# Patient Record
Sex: FEMALE | Race: WHITE | NOT HISPANIC OR LATINO | Employment: OTHER | URBAN - METROPOLITAN AREA
[De-identification: names, ages, dates, MRNs, and addresses within clinical notes are randomized per-mention and may not be internally consistent; named-entity substitution may affect disease eponyms.]

---

## 2017-03-06 ENCOUNTER — OFFICE VISIT (OUTPATIENT)
Dept: PULMONOLOGY | Facility: HOSPICE | Age: 62
End: 2017-03-06
Payer: MEDICARE

## 2017-03-06 VITALS
SYSTOLIC BLOOD PRESSURE: 122 MMHG | HEIGHT: 67 IN | HEART RATE: 77 BPM | TEMPERATURE: 97.3 F | BODY MASS INDEX: 34.12 KG/M2 | RESPIRATION RATE: 16 BRPM | DIASTOLIC BLOOD PRESSURE: 74 MMHG | WEIGHT: 217.4 LBS | OXYGEN SATURATION: 97 %

## 2017-03-06 DIAGNOSIS — R93.89 ABNORMAL CT OF THE CHEST: ICD-10-CM

## 2017-03-06 DIAGNOSIS — R09.02 HYPOXEMIA: ICD-10-CM

## 2017-03-06 DIAGNOSIS — J84.9 INTERSTITIAL LUNG DISEASE (HCC): ICD-10-CM

## 2017-03-06 DIAGNOSIS — J45.30 MILD PERSISTENT ASTHMA WITHOUT COMPLICATION: ICD-10-CM

## 2017-03-06 DIAGNOSIS — G47.33 OSA (OBSTRUCTIVE SLEEP APNEA): ICD-10-CM

## 2017-03-06 PROCEDURE — 3017F COLORECTAL CA SCREEN DOC REV: CPT | Performed by: NURSE PRACTITIONER

## 2017-03-06 PROCEDURE — 1036F TOBACCO NON-USER: CPT | Performed by: NURSE PRACTITIONER

## 2017-03-06 PROCEDURE — 99214 OFFICE O/P EST MOD 30 MIN: CPT | Performed by: NURSE PRACTITIONER

## 2017-03-06 PROCEDURE — G8482 FLU IMMUNIZE ORDER/ADMIN: HCPCS | Performed by: NURSE PRACTITIONER

## 2017-03-06 PROCEDURE — G8419 CALC BMI OUT NRM PARAM NOF/U: HCPCS | Performed by: NURSE PRACTITIONER

## 2017-03-06 PROCEDURE — 3014F SCREEN MAMMO DOC REV: CPT | Performed by: NURSE PRACTITIONER

## 2017-03-06 PROCEDURE — G8432 DEP SCR NOT DOC, RNG: HCPCS | Performed by: NURSE PRACTITIONER

## 2017-03-06 RX ORDER — CODEINE PHOSPHATE AND GUAIFENESIN 10; 100 MG/5ML; MG/5ML
5 SOLUTION ORAL EVERY 6 HOURS PRN
Qty: 240 ML | Refills: 0 | Status: SHIPPED | OUTPATIENT
Start: 2017-03-06 | End: 2017-03-31 | Stop reason: SDUPTHER

## 2017-03-06 NOTE — MR AVS SNAPSHOT
"        Tayler Osuna   3/6/2017 9:20 AM   Office Visit   MRN: [id number]    Department:  [facility]   Dept Phone:  [phone]    Description:  Female : 1955   Provider:  ANDREI Paul           Reason for Visit     Follow-Up 6 month follow up      Allergies as of 3/6/2017     Allergen Noted Reactions    Tape 2015   Rash    rash    Clindamycin 2011       C-Diff (Intestinal Infection)      You were diagnosed with     Mild persistent asthma without complication   [567436]       Interstitial lung disease (CMS-HCC)   [198166]       Hypoxemia   [799.02.ICD-9-CM]       Abnormal CT of the chest   [141824]       BMI 34.0-34.9,adult   [974998]       ELIA (obstructive sleep apnea)   [931227]         Vital Signs     Blood Pressure Pulse Temperature Respirations Height Weight    122/74 mmHg 77 36.3 °C (97.3 °F) 16 1.702 m (5' 7.01\") 98.612 kg (217 lb 6.4 oz)    Body Mass Index Oxygen Saturation Last Menstrual Period Smoking Status          34.04 kg/m2 97% 2008 Never Smoker         Basic Information     Date Of Birth Sex Race Ethnicity Preferred Language    1955 Female White Non- English      Your appointments     May 30, 2017  2:00 PM   15 Minute with Ashkan Walton M.D.   [facility] (--)    [address] [place] [id number] [phone]            Sep 05, 2017  9:20 AM   Established Patient Pul with ANDREI Paul   [facility] (--)    [address] [place] [address] [address]  [facility] [id number] [phone]              Problem List              ICD-10-CM Priority Class Noted - Resolved    Interstitial lung disease (CMS-HCC) J84.9   6/10/2011 - Present    Other chest pain R07.89   6/10/2011 - Present    Asthma J45.909   6/10/2011 - Present    Cholecystitis K81.9   6/10/2011 - Present    Arthritis M19.90   6/10/2011 - Present    Chronic pain syndrome G89.4   6/10/2011 - Present   " Lumbar disc disease M51.9   6/10/2011 - Present    Orbital fracture (HCC) S02.80XA   7/26/2011 - Present    Depression F32.9   7/26/2011 - Present    Anxiety F41.9   7/26/2011 - Present    Obesity E66.9   7/26/2011 - Present    Chest pain R07.9   7/1/2015 - Present    Hypoxemia R09.02   4/6/2016 - Present    Abnormal CT of the chest R93.8   4/6/2016 - Present    GERD (gastroesophageal reflux disease) K21.9   8/9/2016 - Present      Health Maintenance        Date Due Completion Dates    IMM DTaP/Tdap/Td Vaccine (1 - Tdap) 7/27/1976 ---    PAP SMEAR 1/1/2016 1/1/2013 (Done)    Override on 1/1/2013: Done    IMM INFLUENZA (1) 9/1/2016 9/21/2015, 9/25/2010, 10/7/2009    MAMMOGRAM 5/31/2017 5/31/2016 (Done)    Override on 5/31/2016: Done    COLONOSCOPY 3/2/2018 3/2/2015 (Done)    Override on 3/2/2015: Done (exact day unk)            Current Immunizations     13-VALENT PCV PREVNAR 6/1/2015    INFLUENZA VACCINE H1N1 10/30/2009  1:45 PM    Influenza TIV (IM) 2/28/2017, 9/21/2015, 10/7/2009  9:54 PM    Pneumococcal Vaccine (UF)Historical Data 12/28/2011  9:56 PM    Pneumococcal polysaccharide vaccine (PPSV-23) 8/9/2016    Tuberculin Skin Test 6/7/2016      Below and/or attached are the medications your provider expects you to take. Review all of your home medications and newly ordered medications with your provider and/or pharmacist. Follow medication instructions as directed by your provider and/or pharmacist. Please keep your medication list with you and share with your provider. Update the information when medications are discontinued, doses are changed, or new medications (including over-the-counter products) are added; and carry medication information at all times in the event of emergency situations     Allergies:  TAPE - Rash     CLINDAMYCIN - (reactions not documented)               Medications  Valid as of: March 06, 2017 -  9:45 AM    Generic Name Brand Name Tablet Size Instructions for use    Albuterol Sulfate  (Nebu Soln) PROVENTIL 2.5mg/3ml Inhale 2.5 mg mist 4 times a day.        Albuterol Sulfate (Aero Soln) albuterol 108 (90 BASE) MCG/ACT Inhale 2 Puffs by mouth every 6 hours as needed.        Aspirin (Tab)  MG Take 325 mg by mouth Once.        Cholestyramine (Pack) QUESTRAN 4 G Take 1 Packet by mouth every day.        Cyclobenzaprine HCl (Tab) FLEXERIL 10 MG Take  by mouth.        Fluticasone-Salmeterol (AEROSOL POWDER, BREATH ACTIVATED) ADVAIR DISKUS 500-50 MCG/DOSE INHALE 1 PUFF BY MOUTH 2 TIMES A DAY, rinse mouth ater each use        Furosemide (Tab) LASIX 20 MG Take 1 Tab by mouth every day.        Guaifenesin-Codeine (Solution) ROBITUSSIN -10 mg/5mL Take 5 mL by mouth every 6 hours as needed for Cough.        Guaifenesin-Codeine (Solution) ROBITUSSIN -10 mg/5mL Take 5 mL by mouth every 6 hours as needed for Cough.        Guaifenesin-Codeine (Solution) ROBITUSSIN -10 mg/5mL Take 5 mL by mouth every 6 hours as needed for Cough.        LevoFLOXacin (Tab) LEVAQUIN 500 MG Take 1 Tab by mouth every day.        LevoFLOXacin (Tab) LEVAQUIN 500 MG Take 1 Tab by mouth every day.        Multiple Vitamins-Minerals (Tab) THERAGRAN-M  Take 1 Tab by mouth every day.        Non Formulary Request Non Formulary Request  Uses 3 liters 02 at rest 5-6 liters with exertion        NON SPECIFIED   O2 3-4L/nc at rest; 5-6L/nc with exertion        Ondansetron HCl (Tab) ZOFRAN 4 MG Take 4 mg by mouth every four hours as needed for Nausea/Vomiting. As needed for nausea or vomiting.         Oxycodone-Acetaminophen (Tab) PERCOCET 5-325 MG Take 1-2 Tabs by mouth every four hours as needed (Pain).        Potassium Chloride (Tab CR) K-TAB 20 MEQ Take 20 mEq by mouth 2 times a day.        PredniSONE (Tab) DELTASONE 10 MG 1 po daily and as directed During periods of exacerbation use 3 po x days, 2 po x days then back to maintenance dose of 10 mg daily        Promethazine HCl (Tab) PHENERGAN 25 MG Take one by mouth  everyday        RaNITidine HCl (Tab) ZANTAC 150 MG Take 150 mg by mouth 2 times a day.        Tiotropium Bromide Monohydrate (Cap) SPIRIVA HANDIHALER 18 MCG inhale contents of 1 capsule once daily, using handihaler        .                 Medicines prescribed today were sent to:     [facility] - [place], [place] - [address]    [address] [facility] [place] [address]    Phone: [phone] Fax: [phone]    Open 24 Hours?: No      Medication refill instructions:       If your prescription bottle indicates you have medication refills left, it is not necessary to call your provider’s office. Please contact your pharmacy and they will refill your medication.    If your prescription bottle indicates you do not have any refills left, you may request refills at any time through one of the following ways: The online ScanNano system (except Urgent Care), by calling your provider’s office, or by asking your pharmacy to contact your provider’s office with a refill request. Medication refills are processed only during regular business hours and may not be available until the next business day. Your provider may request additional information or to have a follow-up visit with you prior to refilling your medication.   *Please Note: Medication refills are assigned a new Rx number when refilled electronically. Your pharmacy may indicate that no refills were authorized even though a new prescription for the same medication is available at the pharmacy. Please request the medicine by name with the pharmacy before contacting your provider for a refill.           ScanNano Access Code: Activation code not generated  Current ScanNano Status: Active

## 2017-03-28 ENCOUNTER — TELEPHONE (OUTPATIENT)
Dept: PULMONOLOGY | Facility: HOSPICE | Age: 62
End: 2017-03-28

## 2017-03-28 NOTE — TELEPHONE ENCOUNTER
Pt called in to say she has Acute Bronchitis. She was given Prednisone, Augmentin, and Nebs. She made a follow up for 3/31/17/ap

## 2017-03-31 ENCOUNTER — HOSPITAL ENCOUNTER (OUTPATIENT)
Dept: RADIOLOGY | Facility: MEDICAL CENTER | Age: 62
End: 2017-03-31
Attending: NURSE PRACTITIONER
Payer: MEDICARE

## 2017-03-31 ENCOUNTER — OFFICE VISIT (OUTPATIENT)
Dept: PULMONOLOGY | Facility: HOSPICE | Age: 62
End: 2017-03-31
Payer: MEDICARE

## 2017-03-31 VITALS
HEIGHT: 67 IN | BODY MASS INDEX: 32.49 KG/M2 | SYSTOLIC BLOOD PRESSURE: 130 MMHG | DIASTOLIC BLOOD PRESSURE: 84 MMHG | WEIGHT: 207 LBS | HEART RATE: 87 BPM | OXYGEN SATURATION: 94 % | TEMPERATURE: 97.9 F | RESPIRATION RATE: 16 BRPM

## 2017-03-31 DIAGNOSIS — J45.41 MODERATE PERSISTENT ASTHMA WITH ACUTE EXACERBATION: ICD-10-CM

## 2017-03-31 DIAGNOSIS — J45.30 MILD PERSISTENT ASTHMA WITHOUT COMPLICATION: ICD-10-CM

## 2017-03-31 DIAGNOSIS — J84.9 INTERSTITIAL LUNG DISEASE (HCC): ICD-10-CM

## 2017-03-31 DIAGNOSIS — R09.02 HYPOXEMIA: ICD-10-CM

## 2017-03-31 DIAGNOSIS — R93.89 ABNORMAL CT OF THE CHEST: ICD-10-CM

## 2017-03-31 PROCEDURE — 99214 OFFICE O/P EST MOD 30 MIN: CPT | Performed by: NURSE PRACTITIONER

## 2017-03-31 PROCEDURE — G8432 DEP SCR NOT DOC, RNG: HCPCS | Performed by: NURSE PRACTITIONER

## 2017-03-31 PROCEDURE — 3014F SCREEN MAMMO DOC REV: CPT | Performed by: NURSE PRACTITIONER

## 2017-03-31 PROCEDURE — G8419 CALC BMI OUT NRM PARAM NOF/U: HCPCS | Performed by: NURSE PRACTITIONER

## 2017-03-31 PROCEDURE — 1036F TOBACCO NON-USER: CPT | Performed by: NURSE PRACTITIONER

## 2017-03-31 PROCEDURE — 71020 DX-CHEST-2 VIEWS: CPT

## 2017-03-31 PROCEDURE — G8482 FLU IMMUNIZE ORDER/ADMIN: HCPCS | Performed by: NURSE PRACTITIONER

## 2017-03-31 RX ORDER — CODEINE PHOSPHATE AND GUAIFENESIN 10; 100 MG/5ML; MG/5ML
5 SOLUTION ORAL EVERY 6 HOURS PRN
Qty: 240 ML | Refills: 1 | Status: SHIPPED | OUTPATIENT
Start: 2017-03-31 | End: 2017-08-21 | Stop reason: SDUPTHER

## 2017-03-31 RX ORDER — PREDNISONE 10 MG/1
TABLET ORAL
Qty: 50 TAB | Refills: 2 | Status: SHIPPED | OUTPATIENT
Start: 2017-03-31 | End: 2017-05-30

## 2017-03-31 NOTE — PROGRESS NOTES
"CC:  Here for f/u sleep and pulmonary issues as listed below    HPI:   Tayler Osuna is a 61 y.o. year old female here today for follow-up on her Asthma and Obstructive sleep apnea with sick visit.  Patient has been sick since the 23rd with \"bronchitis\" producing green E and yellow sputum with fever and wheeze. She was started on Augmentin without change and then her internist changed her to Levaquin and taper of prednisone starting at 30mg with improvement of her symptoms with now light yellow or clear sputum with cough. She continues to have shortness of breath with wheezing, nasal congestion. Fever has resolved. She is using her nebulizers every 4 hours without complete resolution of wheeze. She has been taking Mucinex twice a day and Cheratussin cough medicine at night to help her sleep, Claritin and Benadryl for nasal congestion.S he does have a flutter valve for mucous clearance, and was reminded to use it with sickness. She denies hemoptysis, chest tightness, night sweats. Chest x-ray completed in today 3-30 1-2017 showing persistent bilateral relatively diffuse interstitial prominence consistent with interstitial lung disease with scarring in the lingula. No new focal consolidation identified.  he is compliant on Advair, Spiriva, Albuterol per nebulizer and Proair HFA inhaler.PFT's were updated 9/15/2016 and indicated an FEV1 of 2.39 L, 83% predicted, TLC 79% and DLCO 97%. Patient is compliance using 3 L of oxygen 24 7 and increases to 6 L with exertion. Patient started a new diet at the beginning year with weight loss.    She is compliant on CPAP 12 CM with 3 LPM 02 bleed in. CNOX in the past on this setting indicated adequate saturations with a mean of 95.3%. She did not bring her compliance chip to today's office visit. She reports she uses it every night for at least 8 hours. She sleeps better overall while wearing the machine and denies morning headache.    Prior Cardiology consult indicated a history " of Pulmonary fibrosis and Pulmonary hypertension. Her pulmonary pressures have improved since she has been on supplemental oxygen. She is on Lasix which is managed by Dr. Pierce, Cardiology. She has a history of respiratory failure and MRSA pneumonia with ARDS in 2009. Chest xray 9/2015 indicated stable scarring. Echo in 10/2015 indicates an EF of 70% with grade I diastolic dysfunction and an estimated RVSP of 40 mmHg. CTA 10/2015 indicated no evidence of PE. There is underlying areas of non specific fibrosis and bronchiectasis, similar to CT in 2011.  Chest xray April 2016 indicated bilateral areas of linear increased density with architectural distortion to be due to scarring or interstitial lung disease.    She had an exacerbation in March 2016 requiring antibiotics and steroids. Sputum cultures and Immunoglobulin levels were ordered. IgM, IgG and IgA levels are WNL.           Patient Active Problem List    Diagnosis Date Noted   • GERD (gastroesophageal reflux disease) 08/09/2016   • Hypoxemia 04/06/2016   • Abnormal CT of the chest 04/06/2016   • Chest pain 07/01/2015   • Orbital fracture (HCC) 07/26/2011   • Depression 07/26/2011   • Anxiety 07/26/2011   • Obesity 07/26/2011   • Interstitial lung disease (CMS-HCC) 06/10/2011   • Other chest pain 06/10/2011   • Asthma 06/10/2011   • Cholecystitis 06/10/2011   • Arthritis 06/10/2011   • Chronic pain syndrome 06/10/2011   • Lumbar disc disease 06/10/2011       Past Medical History   Diagnosis Date   • GERD (gastroesophageal reflux disease)    • ASTHMA    • Psychiatric problem    • Depression    • ARDS (adult respiratory distress syndrome) (CMS-HCC)    • Hx MRSA infection    • Arrhythmia    • Sleep apnea    • Arthritis      spine, knees   • MRSA (methicillin resistant Staphylococcus aureus)    • Backpain      L2 Compression Fx (chronic -Morphine 60mg x6 tabs qhs)    • O2 dependent    • Clostridium difficile diarrhea 2012   • Pulmonary HTN (CMS-HCC)    •  Pulmonary fibrosis (CMS-HCC)      Stan   • MRSA infection    • Gall stones    • ARDS (adult respiratory distress syndrome) (CMS-HCC)        Past Surgical History   Procedure Laterality Date   • Tracheostomy  9/3/2009     Performed by RIC SINGLETARY at SURGERY [facility] ORS   • Gastrostomy laparoscopic  9/12/2009     Performed by IGNACIO DEGROOT at SURGERY [facility] ORS   • Orbital fracture orif  7/26/2011     Performed by JANIE GEORGES at SURGERY [facility] ORS   • Orbital fracture orif  8/23/2011     Performed by JANIE GEORGES at SURGERY SAME DAY [facility] ORS   • Other  8/09     chest tube, trach, g-tube   • Other  1986     skin graft to leg   • Rectus repair  12/28/2011     Performed by AUGUST OREILLY at SURGERY SAME DAY [facility] ORS   • Other orthopedic surgery       2 hip repl orbital plates       History reviewed. No pertinent family history.    Social History     Social History   • Marital Status:      Spouse Name: N/A   • Number of Children: N/A   • Years of Education: N/A     Occupational History   • Not on file.     Social History Main Topics   • Smoking status: Never Smoker    • Smokeless tobacco: Never Used   • Alcohol Use: 0.0 oz/week     0 Standard drinks or equivalent per week      Comment: rarely   • Drug Use: No   • Sexual Activity: Not on file     Other Topics Concern   • Not on file     Social History Narrative       Current Outpatient Prescriptions   Medication Sig Dispense Refill   • guaifenesin-codeine (CHERATUSSIN AC) Solution oral solution Take 5 mL by mouth every 6 hours as needed for Cough. 240 mL 1   • predniSONE (DELTASONE) 10 MG Tab 60mg x 5 days, 50mg x 5 days, 40mg x 5 days, 30mg x 5 days, 20mg x 5 days, 10mg x 5 days 50 Tab 2   • nystatin (MYCOSTATIN) 732124 UNIT/GM Cream topical cream Apply topically twice daily to affected areas 1 Tube 1   • triamcinolone acetonide (KENALOG) 0.1 % Cream Apply to affected area twice a day 1 Tube 1   • mupirocin  (BACTROBAN) 2 % Ointment Apply to affected areas every day 1 Tube 2   • promethazine (PHENERGAN) 25 MG Tab Take one by mouth everyday 30 Tab 1   • ADVAIR DISKUS 500-50 MCG/DOSE AEROSOL POWDER, BREATH ACTIVATED INHALE 1 PUFF BY MOUTH 2 TIMES A DAY, rinse mouth ater each use 3 Inhaler 3   • potassium Chloride ER (K-TAB) 20 MEQ Tab CR tablet Take 20 mEq by mouth 2 times a day.     • SPIRIVA HANDIHALER 18 MCG Cap inhale contents of 1 capsule once daily, using handihaler 30 Cap 5   • furosemide (LASIX) 20 MG Tab Take 1 Tab by mouth every day.     • Non Formulary Request Uses 3 liters 02 at rest 5-6 liters with exertion     • aspirin (ASA) 325 MG TABS Take 325 mg by mouth Once.     • ranitidine (ZANTAC) 150 MG TABS Take 150 mg by mouth 2 times a day.     • cholestyramine (QUESTRAN) 4 G packet Take 1 Packet by mouth every day.     • albuterol (VENTOLIN OR PROVENTIL) 108 (90 BASE) MCG/ACT AERS Inhale 2 Puffs by mouth every 6 hours as needed.     • therapeutic multivitamin-minerals (THERAGRAN-M) TABS Take 1 Tab by mouth every day.     • NON SPECIFIED O2 3-4L/nc at rest; 5-6L/nc with exertion     • albuterol (PROVENTIL) 2.5mg/3ml NEBU Inhale 2.5 mg mist 4 times a day.     • ondansetron (ZOFRAN) 4 MG TABS Take 4 mg by mouth every four hours as needed for Nausea/Vomiting. As needed for nausea or vomiting.      • oxycodone-acetaminophen (PERCOCET) 5-325 MG Tab Take 1-2 Tabs by mouth every four hours as needed (Pain). 20 Tab 0   • cyclobenzaprine (FLEXERIL) 10 MG TABS Take  by mouth.       No current facility-administered medications for this visit.          Allergies: Tape and Clindamycin      ROS   Gen: Denies fever, chills, unintentional weight loss, fatigue, night sweats  E/N/T: Denies ear pain, nasal congestion  Resp:Denies Dyspnea, wheezing, cough, sputum production, hemoptysis  CV: Denies chest pain, chest tightness, palpitations, BLE edema  Sleep:Denies morning headache, insomnia, daytime somnolence, snoring, gasping for  "air, apnea  Neuro: Denies frequent headaches, weakness, dizziness  GI: Denies N/V, acid reflux/heartburn  See HPI.  All other systems reviewed and negative      Vital signs for this encounter:  Filed Vitals:    03/31/17 0951   Height: 1.702 m (5' 7.01\")   Weight: 93.895 kg (207 lb)   Weight % change since last entry.: 0 %   BP: 130/84   Pulse: 87   BMI (Calculated): 32.41   Resp: 16   Temp: 36.6 °C (97.9 °F)   O2 sat % on O2: 94 %   O2 Flow Rate (L/min): 3                 Physical Exam:   Appearance: well developed, well nourished, no acute distress.  Eyes: PERRL, EOM intact, sclere white, conjunctiva moist.  Ears: no lesions or deformities.  Hearing: grossly intact.  Nose: no lesions or deformities.  Dentition: good dentition.  Oropharynx: tongue normal, posterior pharynx without erythema or exudate.  Neck: supple, trachea midline, no masses.  Respiratory effort: no intercostal retractions or use of accessory muscles.  Lung auscultation: Bilateral rhonchi throughout  Heart auscultation: no murmur, rub, or gallop.   Extremities: no cyanosis or edema.  Abdomen: soft, non-tender, no masses.  Gait and station: grossly normal   Digits and Nails: no clubbing, cyanosis, petechiae, or nodes.  Cranial nerves: grossly normal.  Motor: no focal deficits observed.  Skin: no rashes, lesions, or ulcers noted.  Orientation: oriented to time, place, and person.  Mood and affect: mood and affect appropriate, normal interaction with examiner.    Assessment   1. Moderate persistent asthma with acute exacerbation  DX-CHEST-2 VIEWS    CANCELED: DX-CHEST-2 VIEWS   2. Mild persistent asthma without complication  guaifenesin-codeine (CHERATUSSIN AC) Solution oral solution   3. Interstitial lung disease (CMS-HCC)  predniSONE (DELTASONE) 10 MG Tab   4. Abnormal CT of the chest     5. Hypoxemia         PLAN:   Patient Instructions   1) Continue Advair 500/50, Spiriva, Albuterol per neb and HFA inhaler.    2) Continue Levaquin 500 mg 1 po " daily x 10 days and Prednisone taper to have on hand  3) Continue to follow with Cardiology for diuretic management. Request consult records from Dr. Pierce, Cardiology.    4) RX for Cheratussin A/C 5 ml po q 6 hours prn cough to have on hand.    5) Continue 02 3 LPM 24/7, 6 LPM with exertion.  6) Ongoing weight loss efforts encourage. Encouraged routine walking/exercise.  7) She does have a flutter valve on hand to help with mucous clearance.  8) Continue CPAP at 12 CM H20 with 3 LPM 02 bleed in. Order to DME for new mask and supplies. Compliance chip at follow up.  9) She is up to date on her Prevnar 13, Pneumovax 23 and Influence vaccines.    10) CXR complete at ancillary department to r/o PNA

## 2017-03-31 NOTE — MR AVS SNAPSHOT
"        Tayler Osuna   3/31/2017 10:00 AM   Office Visit   MRN: [id number]    Department:  [facility]   Dept Phone:  [phone]    Description:  Female : 1955   Provider:  BHASKAR Rodriguez           Reason for Visit     Asthma Last seen 3/6/17     Cough phlegm thick yellow green, wheezing       Allergies as of 3/31/2017     Allergen Noted Reactions    Tape 2015   Rash    rash    Clindamycin 2011       C-Diff (Intestinal Infection)      You were diagnosed with     Moderate persistent asthma with acute exacerbation   [0066709]       Mild persistent asthma without complication   [195081]       Interstitial lung disease (CMS-HCC)   [447451]         Vital Signs     Blood Pressure Pulse Temperature Respirations Height Weight    130/84 mmHg 87 36.6 °C (97.9 °F) 16 1.702 m (5' 7.01\") 93.895 kg (207 lb)    Body Mass Index Oxygen Saturation Last Menstrual Period Smoking Status          32.41 kg/m2 94% 2008 Never Smoker         Basic Information     Date Of Birth Sex Race Ethnicity Preferred Language    1955 Female White Non- English      Your appointments     May 30, 2017  2:00 PM   15 Minute with Ashkan Walton M.D.   [facility] (--)    [address] [place] [id number] [phone]            Sep 05, 2017  9:20 AM   Established Patient Pul with ANDREI Paul   [facility] (--)    [address] [place] [address] [address]  [facility] [address] [phone]              Problem List              ICD-10-CM Priority Class Noted - Resolved    Interstitial lung disease (CMS-HCC) J84.9   6/10/2011 - Present    Other chest pain R07.89   6/10/2011 - Present    Asthma J45.909   6/10/2011 - Present    Cholecystitis K81.9   6/10/2011 - Present    Arthritis M19.90   6/10/2011 - Present    Chronic pain syndrome G89.4   6/10/2011 - Present    Lumbar disc disease M51.9   6/10/2011 - Present    Orbital " fracture (HCC) S02.80XA   7/26/2011 - Present    Depression F32.9   7/26/2011 - Present    Anxiety F41.9   7/26/2011 - Present    Obesity E66.9   7/26/2011 - Present    Chest pain R07.9   7/1/2015 - Present    Hypoxemia R09.02   4/6/2016 - Present    Abnormal CT of the chest R93.8   4/6/2016 - Present    GERD (gastroesophageal reflux disease) K21.9   8/9/2016 - Present      Health Maintenance        Date Due Completion Dates    IMM DTaP/Tdap/Td Vaccine (1 - Tdap) 7/27/1976 ---    PAP SMEAR 1/1/2016 1/1/2013 (Done)    Override on 1/1/2013: Done    MAMMOGRAM 5/31/2017 5/31/2016 (Done)    Override on 5/31/2016: Done    COLONOSCOPY 3/2/2018 3/2/2015 (Done)    Override on 3/2/2015: Done (exact day unk)            Current Immunizations     13-VALENT PCV PREVNAR 6/1/2015    INFLUENZA VACCINE H1N1 10/30/2009  1:45 PM    Influenza TIV (IM) 2/28/2017, 9/21/2015, 10/7/2009  9:54 PM    Pneumococcal Vaccine (UF)Historical Data 12/28/2011  9:56 PM    Pneumococcal polysaccharide vaccine (PPSV-23) 8/9/2016    Tuberculin Skin Test 6/7/2016      Below and/or attached are the medications your provider expects you to take. Review all of your home medications and newly ordered medications with your provider and/or pharmacist. Follow medication instructions as directed by your provider and/or pharmacist. Please keep your medication list with you and share with your provider. Update the information when medications are discontinued, doses are changed, or new medications (including over-the-counter products) are added; and carry medication information at all times in the event of emergency situations     Allergies:  TAPE - Rash     CLINDAMYCIN - (reactions not documented)               Medications  Valid as of: March 31, 2017 - 10:27 AM    Generic Name Brand Name Tablet Size Instructions for use    Albuterol Sulfate (Nebu Soln) PROVENTIL 2.5mg/3ml Inhale 2.5 mg mist 4 times a day.        Albuterol Sulfate (Aero Soln) albuterol 108 (90  BASE) MCG/ACT Inhale 2 Puffs by mouth every 6 hours as needed.        Aspirin (Tab)  MG Take 325 mg by mouth Once.        Cholestyramine (Pack) QUESTRAN 4 G Take 1 Packet by mouth every day.        Cyclobenzaprine HCl (Tab) FLEXERIL 10 MG Take  by mouth.        Fluticasone-Salmeterol (AEROSOL POWDER, BREATH ACTIVATED) ADVAIR DISKUS 500-50 MCG/DOSE INHALE 1 PUFF BY MOUTH 2 TIMES A DAY, rinse mouth ater each use        Furosemide (Tab) LASIX 20 MG Take 1 Tab by mouth every day.        Guaifenesin-Codeine (Solution) ROBITUSSIN -10 mg/5mL Take 5 mL by mouth every 6 hours as needed for Cough.        Multiple Vitamins-Minerals (Tab) THERAGRAN-M  Take 1 Tab by mouth every day.        Mupirocin (Ointment) BACTROBAN 2 % Apply to affected areas every day        Non Formulary Request Non Formulary Request  Uses 3 liters 02 at rest 5-6 liters with exertion        NON SPECIFIED   O2 3-4L/nc at rest; 5-6L/nc with exertion        Nystatin (Cream) MYCOSTATIN 561542 UNIT/GM Apply topically twice daily to affected areas        Ondansetron HCl (Tab) ZOFRAN 4 MG Take 4 mg by mouth every four hours as needed for Nausea/Vomiting. As needed for nausea or vomiting.         Oxycodone-Acetaminophen (Tab) PERCOCET 5-325 MG Take 1-2 Tabs by mouth every four hours as needed (Pain).        Potassium Chloride (Tab CR) K-TAB 20 MEQ Take 20 mEq by mouth 2 times a day.        PredniSONE (Tab) DELTASONE 10 MG 60mg x 5 days, 50mg x 5 days, 40mg x 5 days, 30mg x 5 days, 20mg x 5 days, 10mg x 5 days        Promethazine HCl (Tab) PHENERGAN 25 MG Take one by mouth everyday        RaNITidine HCl (Tab) ZANTAC 150 MG Take 150 mg by mouth 2 times a day.        Tiotropium Bromide Monohydrate (Cap) SPIRIVA HANDIHALER 18 MCG inhale contents of 1 capsule once daily, using handihaler        Triamcinolone Acetonide (Cream) KENALOG 0.1 % Apply to affected area twice a day        .                 Medicines prescribed today were sent to:     [facility]  [id number] [facility] [place], [place] [facility] [address]    [address] [place] [address]    Phone: [phone] Fax: [phone]    Open 24 Hours?: No    [facility] [place]    [address] [place] [address]    Phone: [phone] Fax: [phone]      Medication refill instructions:       If your prescription bottle indicates you have medication refills left, it is not necessary to call your provider’s office. Please contact your pharmacy and they will refill your medication.    If your prescription bottle indicates you do not have any refills left, you may request refills at any time through one of the following ways: The online [employer] system (except Urgent Care), by calling your provider’s office, or by asking your pharmacy to contact your provider’s office with a refill request. Medication refills are processed only during regular business hours and may not be available until the next business day. Your provider may request additional information or to have a follow-up visit with you prior to refilling your medication.   *Please Note: Medication refills are assigned a new Rx number when refilled electronically. Your pharmacy may indicate that no refills were authorized even though a new prescription for the same medication is available at the pharmacy. Please request the medicine by name with the pharmacy before contacting your provider for a refill.        Your To Do List     Future Labs/Procedures Complete By Expires    DX-CHEST-2 VIEWS  As directed 3/31/2018      Instructions    1) Continue Advair 500/50, Spiriva, Albuterol per neb and HFA inhaler.    2) Continue Levaquin 500 mg 1 po daily x 10 days and Prednisone taper to have on hand  3) Continue to follow with Cardiology for diuretic management. Request consult records from Dr. Pierce, Cardiology.    4) RX for Cheratussin A/C 5 ml po q 6 hours prn cough to have on hand.    5) Continue 02 3 LPM 24/7, 6 LPM with exertion.  6) Ongoing weight loss efforts  encourage. Encouraged routine walking/exercise.  7) She does have a flutter valve on hand to help with mucous clearance.  8) Continue CPAP at 12 CM H20 with 3 LPM 02 bleed in. Order to DME for new mask and supplies. Compliance chip at follow up.  9) She is up to date on her Prevnar 13, Pneumovax 23 and Influence vaccines.    10) CXR complete at ancillary department     10) Follow up in 6 months, sooner if needed.            [employer] Access Code: Activation code not generated  Current [employer] Status: Active

## 2017-03-31 NOTE — PATIENT INSTRUCTIONS
1) Continue Advair 500/50, Spiriva, Albuterol per neb and HFA inhaler.    2) Continue Levaquin 500 mg 1 po daily x 10 days and continue Prednisone increasing to 60mg and then taper  3) Continue to follow with Cardiology for diuretic management. Request consult records from Dr. Pierce, Cardiology.    4) RX for Cheratussin A/C 5 ml po q 6 hours prn cough to have on hand.    5) Continue 02 3 LPM 24/7, 6 LPM with exertion.  6) Ongoing weight loss efforts encourage. Encouraged routine walking/exercise.  7) She does have a flutter valve on hand to help with mucous clearance.  8) Continue CPAP at 12 CM H20 with 3 LPM 02 bleed in. Order to DME for new mask and supplies. Compliance chip at follow up.  9) She is up to date on her Prevnar 13, Pneumovax 23 and Influence vaccines.    10) CXR complete at ancillary department to r/o PNA  11) Return Has appt with Jeimy in September, for Compliance, review of symptoms, if not sooner, follow up with ABBIE Farley.

## 2017-04-14 ENCOUNTER — TELEPHONE (OUTPATIENT)
Dept: PULMONOLOGY | Facility: HOSPICE | Age: 62
End: 2017-04-14

## 2017-05-02 ENCOUNTER — TELEPHONE (OUTPATIENT)
Dept: PULMONOLOGY | Facility: HOSPICE | Age: 62
End: 2017-05-02

## 2017-05-02 DIAGNOSIS — G47.33 OBSTRUCTIVE SLEEP APNEA: ICD-10-CM

## 2017-05-10 DIAGNOSIS — J45.909 UNCOMPLICATED ASTHMA, UNSPECIFIED ASTHMA SEVERITY: ICD-10-CM

## 2017-05-10 RX ORDER — TIOTROPIUM BROMIDE 18 UG/1
CAPSULE ORAL; RESPIRATORY (INHALATION)
Qty: 30 CAP | Refills: 5 | Status: SHIPPED | OUTPATIENT
Start: 2017-05-10 | End: 2018-01-01 | Stop reason: SDUPTHER

## 2017-05-10 NOTE — TELEPHONE ENCOUNTER
Have we ever prescribed this med? Yes.  If yes, what date? 5/2/2016, ABBIE Craig     Last OV: 3/31/2017 w/ ABBIE Newman     Next OV: 9/5/2017 w/ ABBIE Craig     DX: Asthma    Medications: Advair Diskus 500/ Spiriva Handihaler / Albuterol HFA/ Albuterol Nebu

## 2017-07-06 ENCOUNTER — PATIENT OUTREACH (OUTPATIENT)
Dept: HEALTH INFORMATION MANAGEMENT | Facility: OTHER | Age: 62
End: 2017-07-06

## 2017-07-06 NOTE — PROGRESS NOTES
Outcome: Left Message [place] [facility] [place]     WebIZ Checked & Epic Updated:  no    HealthConnect Verified: no    Attempt # 1

## 2017-07-10 NOTE — PROGRESS NOTES
Attempt #:3    WebIZ Checked & Epic Updated: yes  HealthConnect Verified: no  Verify PCP: yes    Communication Preference Obtained: yes     Review Care Team: yes    Annual Wellness Visit Scheduling  1. Scheduling Status:Not Scheduled. Patient states they are not interested and Patient states this is not mandatory    MyChart Activation: already active  Buzzoohart Shayan: no  Virtual Visits: no  Opt In to Text Messages: no

## 2017-08-17 ENCOUNTER — TELEPHONE (OUTPATIENT)
Dept: PULMONOLOGY | Facility: HOSPICE | Age: 62
End: 2017-08-17

## 2017-08-17 NOTE — TELEPHONE ENCOUNTER
[employer] sent over a potential clinical concern form: States that based on your pt's prescription refill history over the past 6 months of their inhaled long acting anticholingeric, we are concerned that they may be non-compliant to the prescribed dosing regimen and are under-utilizing this medication. Please consider discussing the importance of medication compliance with your pt.

## 2017-08-21 ENCOUNTER — OFFICE VISIT (OUTPATIENT)
Dept: PULMONOLOGY | Facility: HOSPICE | Age: 62
End: 2017-08-21
Payer: MEDICARE

## 2017-08-21 VITALS
HEIGHT: 67 IN | OXYGEN SATURATION: 96 % | BODY MASS INDEX: 31.67 KG/M2 | SYSTOLIC BLOOD PRESSURE: 118 MMHG | RESPIRATION RATE: 16 BRPM | WEIGHT: 201.8 LBS | HEART RATE: 79 BPM | DIASTOLIC BLOOD PRESSURE: 78 MMHG

## 2017-08-21 DIAGNOSIS — J84.9 INTERSTITIAL LUNG DISEASE (HCC): ICD-10-CM

## 2017-08-21 DIAGNOSIS — R05.9 COUGH: ICD-10-CM

## 2017-08-21 DIAGNOSIS — R09.02 HYPOXEMIA: ICD-10-CM

## 2017-08-21 DIAGNOSIS — J45.30 MILD PERSISTENT ASTHMA WITHOUT COMPLICATION: ICD-10-CM

## 2017-08-21 DIAGNOSIS — R93.89 ABNORMAL CT OF THE CHEST: ICD-10-CM

## 2017-08-21 PROCEDURE — 99214 OFFICE O/P EST MOD 30 MIN: CPT | Performed by: NURSE PRACTITIONER

## 2017-08-21 RX ORDER — CODEINE PHOSPHATE AND GUAIFENESIN 10; 100 MG/5ML; MG/5ML
5 SOLUTION ORAL EVERY 6 HOURS PRN
Qty: 300 ML | Refills: 0 | Status: SHIPPED | OUTPATIENT
Start: 2017-08-21 | End: 2018-01-01 | Stop reason: SDUPTHER

## 2017-08-21 RX ORDER — LEVOFLOXACIN 500 MG/1
500 TABLET, FILM COATED ORAL DAILY
Qty: 14 TAB | Refills: 1 | Status: SHIPPED
Start: 2017-08-21 | End: 2018-01-01

## 2017-08-21 RX ORDER — PREDNISONE 10 MG/1
TABLET ORAL
Qty: 40 TAB | Refills: 1 | Status: SHIPPED
Start: 2017-08-21 | End: 2018-01-01 | Stop reason: SDUPTHER
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
## 2017-08-21 NOTE — MR AVS SNAPSHOT
"Tayler Osuna   2017 8:00 AM   Office Visit   MRN: [id number]    Department:  [facility]   Dept Phone:  [phone]    Description:  Female : 1955   Provider:  ANDREI Paul           Reason for Visit     Apnea     Hypoxemia           Allergies as of 2017     Allergen Noted Reactions    Tape 2015   Rash    rash    Clindamycin 2011       C-Diff (Intestinal Infection)      You were diagnosed with     Mild persistent asthma without complication   [444794]       Interstitial lung disease (CMS-[facility])   [497946]       Hypoxemia   [799.02.ICD-9-CM]       Abnormal CT of the chest   [792173]       BMI 31.0-31.9,adult   [664038]       Cough   [786.2.ICD-9-CM]         Vital Signs     Blood Pressure Pulse Respirations Height Weight Body Mass Index    118/78 mmHg 79 16 1.702 m (5' 7\") 91.536 kg (201 lb 12.8 oz) 31.60 kg/m2    Oxygen Saturation Last Menstrual Period Smoking Status             96% 2008 Never Smoker          Basic Information     Date Of Birth Sex Race Ethnicity Preferred Language    1955 Female White Non- English      Your appointments     Sep 27, 2017  1:45 PM   15 Minute with Ashkan Walton M.D.   [facility] ([facility])    [address] [place] [id number]   [phone]            2017  9:00 AM   Pulmonary Function Test with PFT-RM3   [facility] (--)    [address] [place] [address]  [facility] [id number] [phone]            2017 10:20 AM   Established Patient Pul with NARA PaulPSEKOU   [facility] (--)    [address] [place] [address]  [facility] [id number] [phone]              Problem List              ICD-10-CM Priority Class Noted - Resolved    Interstitial lung disease (CMS-HCC) J84.9   6/10/2011 - Present    Other chest pain R07.89   6/10/2011 - Present    Asthma J45.909   6/10/2011 - Present   "    Cholecystitis K81.9   6/10/2011 - Present    Arthritis M19.90   6/10/2011 - Present    Chronic pain syndrome G89.4   6/10/2011 - Present    Lumbar disc disease M51.9   6/10/2011 - Present    Orbital fracture (CMS-[facility]) S02.80XA   7/26/2011 - Present    Obesity E66.9   7/26/2011 - Present    Chest pain R07.9   7/1/2015 - Present    Hypoxemia R09.02   4/6/2016 - Present    Abnormal CT of the chest R93.8   4/6/2016 - Present    GERD (gastroesophageal reflux disease) K21.9   8/9/2016 - Present      Health Maintenance        Date Due Completion Dates    IMM DTaP/Tdap/Td Vaccine (1 - Tdap) 7/27/1976 ---    PAP SMEAR 1/1/2016 1/1/2013 (Done)    Override on 1/1/2013: Done    IMM INFLUENZA (1) 9/1/2017 2/28/2017, 9/21/2015, 10/7/2009    IMM ZOSTER VACCINE 4/1/2022 (Originally 7/27/2017) ---    COLONOSCOPY 3/2/2018 3/2/2015 (Done)    Override on 3/2/2015: Done (exact day unk)    MAMMOGRAM 8/3/2018 8/3/2017, 5/31/2016 (Done)    Override on 5/31/2016: Done            Current Immunizations     13-VALENT PCV PREVNAR 6/1/2015    INFLUENZA VACCINE H1N1 10/30/2009  1:45 PM    Influenza TIV (IM) 2/28/2017, 9/21/2015, 10/7/2009  9:54 PM    Pneumococcal Vaccine (UF)Historical Data 12/28/2011  9:56 PM    Pneumococcal polysaccharide vaccine (PPSV-23) 8/9/2016    Tuberculin Skin Test 6/7/2016      Below and/or attached are the medications your provider expects you to take. Review all of your home medications and newly ordered medications with your provider and/or pharmacist. Follow medication instructions as directed by your provider and/or pharmacist. Please keep your medication list with you and share with your provider. Update the information when medications are discontinued, doses are changed, or new medications (including over-the-counter products) are added; and carry medication information at all times in the event of emergency situations     Allergies:  TAPE - Rash     CLINDAMYCIN - (reactions not documented)                Medications  Valid as of: August 21, 2017 -  8:43 AM    Generic Name Brand Name Tablet Size Instructions for use    Albuterol Sulfate (Nebu Soln) PROVENTIL 2.5mg/3ml Inhale 2.5 mg mist 4 times a day.        Albuterol Sulfate (Aero Soln) albuterol 108 (90 Base) MCG/ACT Inhale 2 Puffs by mouth every 6 hours as needed.        Aspirin (Tab)  MG Take 325 mg by mouth Once.        Cholestyramine (Pack) QUESTRAN 4 g Take 1 Packet by mouth every day.        Cyclobenzaprine HCl (Tab) FLEXERIL 10 MG Take  by mouth.        Fluticasone-Salmeterol (AEROSOL POWDER, BREATH ACTIVATED) ADVAIR DISKUS 500-50 MCG/DOSE INHALE 1 PUFF BY MOUTH 2 TIMES A DAY, rinse mouth ater each use        Furosemide (Tab) LASIX 20 MG Take 1-2 Tabs by mouth 1 time daily as needed (swelling).        Gabapentin (Cap) NEURONTIN 300 MG Take 1 Cap by mouth 4 times a day.        Guaifenesin-Codeine (Solution) ROBITUSSIN -10 mg/5mL Take 5 mL by mouth every 6 hours as needed for Cough.        Influenza Virus Vacc Split PF (Suspension Prefilled Syringe) AFLURIA PRESERVATIVE FREE 0.5 ML inject 0.5 milliliter intramuscularly        LevoFLOXacin (Tab) LEVAQUIN 500 MG Take 1 Tab by mouth every day. Take until gone.        Lidocaine (Patch) LIDODERM 5 % Apply 1 Patch to skin as directed every 24 hours. For shingles pain        Multiple Vitamins-Minerals (Tab) THERAGRAN-M  Take 1 Tab by mouth every day.        Mupirocin (Ointment) BACTROBAN 2 % Apply to affected areas every day        Non Formulary Request Non Formulary Request  Uses 3 liters 02 at rest 5-6 liters with exertion        NON SPECIFIED   O2 3-4L/nc at rest; 5-6L/nc with exertion        Nystatin (Cream) MYCOSTATIN 231105 UNIT/GM Apply topically twice daily to affected areas        Ondansetron HCl (Tab) ZOFRAN 4 MG Take 4 mg by mouth every four hours as needed for Nausea/Vomiting. As needed for nausea or vomiting.         Oxycodone-Acetaminophen (Tab) PERCOCET 5-325 MG Take 1-2 Tabs by mouth  every four hours as needed (Pain).        Oxycodone-Acetaminophen (Tab) PERCOCET 5-325 MG Take 1-2 Tabs by mouth every four hours as needed.        Potassium Chloride (Tab CR) K-TAB 20 MEQ Take 20 mEq by mouth 2 times a day.        PredniSONE (Tab) DELTASONE 10 MG Take 40mg x 4 days, then take 30mg x 4 days, then take 20mg x 4 days, then 10mg x 4 days with food, then discontinue.        Promethazine HCl (Tab) PHENERGAN 25 MG Take one by mouth everyday        RaNITidine HCl (Tab) ZANTAC 150 MG Take 150 mg by mouth 2 times a day.        Tiotropium Bromide Monohydrate (Cap) SPIRIVA HANDIHALER 18 MCG inhale contents of 1 capsule daily, using handihaler        TraMADol HCl (Tab) ULTRAM 50 MG Take 1 Tab by mouth every 8 hours as needed for Moderate Pain or Severe Pain.        Triamcinolone Acetonide (Cream) KENALOG 0.1 % Apply to affected area twice a day        ValACYclovir HCl (Tab) VALTREX 1 GM Take 1 Tab by mouth 3 times a day.        .                 Medicines prescribed today were sent to:     [id number] - [place], [place] - [address]    [address] [facility] [address]    Phone: [phone] Fax: [phone]    Open 24 Hours?: No    [facility]    [address] [facility] [address] [facility] [address]    Phone: [phone] Fax: [phone]    [id number] - [place], [place] - [address]  [facility] [address]    Phone: [phone] Fax: [phone]    Open 24 Hours?: No      Medication refill instructions:       If your prescription bottle indicates you have medication refills left, it is not necessary to call your provider’s office. Please contact your pharmacy and they will refill your medication.    If your prescription bottle indicates you do not have any refills left, you may request refills at any time through one of the following ways: The online [employer] system (except Urgent Care), by calling your provider’s office, or by asking your pharmacy to contact your provider’s  office with a refill request. Medication refills are processed only during regular business hours and may not be available until the next business day. Your provider may request additional information or to have a follow-up visit with you prior to refilling your medication.   *Please Note: Medication refills are assigned a new Rx number when refilled electronically. Your pharmacy may indicate that no refills were authorized even though a new prescription for the same medication is available at the pharmacy. Please request the medicine by name with the pharmacy before contacting your provider for a refill.        Your To Do List     Future Labs/Procedures Complete By Expires    AMB PULMONARY FUNCTION TEST/LAB  11/21/2017 (Approximate) 8/21/2018    Comments:    In 3 months         [employer] Access Code: Activation code not generated  Current [employer] Status: Active

## 2017-11-22 ENCOUNTER — OFFICE VISIT (OUTPATIENT)
Dept: PULMONOLOGY | Facility: HOSPICE | Age: 62
End: 2017-11-22
Payer: MEDICARE

## 2017-11-22 ENCOUNTER — NON-PROVIDER VISIT (OUTPATIENT)
Dept: PULMONOLOGY | Facility: HOSPICE | Age: 62
End: 2017-11-22
Payer: MEDICARE

## 2017-11-22 VITALS
SYSTOLIC BLOOD PRESSURE: 130 MMHG | BODY MASS INDEX: 32.96 KG/M2 | OXYGEN SATURATION: 95 % | DIASTOLIC BLOOD PRESSURE: 72 MMHG | WEIGHT: 210 LBS | HEART RATE: 85 BPM | HEIGHT: 67 IN | RESPIRATION RATE: 16 BRPM

## 2017-11-22 DIAGNOSIS — J45.30 MILD PERSISTENT ASTHMA WITHOUT COMPLICATION: ICD-10-CM

## 2017-11-22 DIAGNOSIS — R09.02 HYPOXEMIA: ICD-10-CM

## 2017-11-22 DIAGNOSIS — J84.9 INTERSTITIAL LUNG DISEASE (HCC): ICD-10-CM

## 2017-11-22 DIAGNOSIS — G47.33 OSA (OBSTRUCTIVE SLEEP APNEA): ICD-10-CM

## 2017-11-22 DIAGNOSIS — Z23 NEED FOR INFLUENZA VACCINATION: ICD-10-CM

## 2017-11-22 DIAGNOSIS — R05.9 COUGH: ICD-10-CM

## 2017-11-22 PROCEDURE — 94060 EVALUATION OF WHEEZING: CPT | Performed by: INTERNAL MEDICINE

## 2017-11-22 PROCEDURE — 94729 DIFFUSING CAPACITY: CPT | Performed by: INTERNAL MEDICINE

## 2017-11-22 PROCEDURE — 99214 OFFICE O/P EST MOD 30 MIN: CPT | Mod: 25 | Performed by: NURSE PRACTITIONER

## 2017-11-22 PROCEDURE — 90686 IIV4 VACC NO PRSV 0.5 ML IM: CPT | Performed by: NURSE PRACTITIONER

## 2017-11-22 PROCEDURE — 94726 PLETHYSMOGRAPHY LUNG VOLUMES: CPT | Performed by: INTERNAL MEDICINE

## 2017-11-22 PROCEDURE — G0008 ADMIN INFLUENZA VIRUS VAC: HCPCS | Performed by: NURSE PRACTITIONER

## 2017-11-22 RX ORDER — NITROGLYCERIN 0.4 MG/1
0.4 TABLET SUBLINGUAL
COMMUNITY

## 2017-11-22 ASSESSMENT — PULMONARY FUNCTION TESTS
FEV1/FVC: 81
FEV1_PERCENT_CHANGE: 0
FEV1/FVC: 80.47
FEV1_PERCENT_PREDICTED: 95
FEV1/FVC_PERCENT_PREDICTED: 103
FVC_PERCENT_PREDICTED: 92
FVC: 3.34
FVC_PERCENT_PREDICTED: 91
FEV1/FVC_PERCENT_CHANGE: 0
FEV1: 2.72
FEV1_PERCENT_PREDICTED: 94
FVC_PREDICTED: 3.67
FEV1/FVC_PERCENT_PREDICTED: 103
FEV1/FVC_PERCENT_PREDICTED: 77
FEV1: 2.69
FVC: 3.38
FEV1_PREDICTED: 2.84
FEV1_PERCENT_CHANGE: 1

## 2017-11-22 NOTE — PROCEDURES
Technician: MIGUEL Jensen    Technician Comment:  Good patient effort & cooperation.  The results of this test meet the ATS/ERS standards for acceptability & reproducibility.  Test was performed on the Inclinix Body Plethysmograph-Elite DX system.  Predicted values were NHanes-3 for spirometry, [facility] for DLCO, ITS for Lung Volumes.  The DLCO was uncorrected for Hgb.  A bronchodilator of Ventolin HFA -2puffs via spacer administered.  DLCO performed during dilation period.    Lung function testing completed November 22, 2017 revealed normal spirometry. No change after bronchodilators. Mild reduction in residual volume noted total lung capacity low normal. Oxygen transfer low-normal. This could be consistent with a borderline restrictive defect, but clinical correlation required    Interpretation:

## 2017-11-22 NOTE — PATIENT INSTRUCTIONS
Plan:    1) Continue Advair 500/50, Spiriva, Albuterol per neb and HFA inhaler. Continue Mucinex OTC and Albuterol nebulizer qid prn to assist with mucous clearance. Complete current Prednisone taper.   2) She does have an RX for Levaquin 500 mg 1 po daily x 10 days and Prednisone taper on hand.    3) Continue to follow with Cardiology for diuretic management. Request consult records from Dr. Pierce, Cardiology.    4) RX for Cheratussin A/C 5 ml po q 6 hours prn cough to have on hand.    5) Continue 02 3 LPM 24/7, 5 LPM with exertion.  6) Ongoing weight loss efforts encourage. Encouraged routine walking/exercise.  7) She does have a flutter valve on hand to help with mucous clearance.  8) Continue CPAP at 12 CM H20 with 3 LPM 02 bleed in.   9) She is up to date on her Prevnar 13 and Pneumovax 23 vaccines. Updated Influenza vaccine today in the office.   10) Follow up in 6 months, sooner if needed.

## 2017-11-22 NOTE — PROGRESS NOTES
Chief Complaint   Patient presents with   • Asthma   • Apnea     12 CM H2O       HPI:  Tayler Osuna is a 62 y.o. year old female here today for follow-up on her Asthma and Obstructive sleep apnea. She is compliant on CPAP 12 CM with 3 LPM 02 bleed in. CNOX in the past on this setting indicated adequate saturations with a mean of 95.3%. Compliance card download indicates an AHI of 0.5 with an average use of 6-7 hours at night. She is using her CPAP nightly and tolerates is well. She does feel she sleeps better on therapy and wakes more refreshed. She denies any morning headaches.      She is on 02 3 LPM during the day, but often requires 5-6 LPM with exertion. She is compliant on Advair, Spiriva, Albuterol per nebulizer and Proair HFA inhaler. She had been on Prednisone 10 mg in the past, but has been weaned off Prednisone all together. PFT's in June 2015 indicated an FEV1 of 2.50 L, 85% predicted with an FEV1/FVC ratio of 81, TLC of 82% and a DLCO of 89% predicted. These are stable from prior PFT's from 2013. PFT's were updated 9/15/2016 and indicated an FEV1 of 2.39 L, 83% predicted, TLC 79% and DLCO 97%. PFT's today in the office indicates an FEV1 of 2.69 L, 94% predicted with an FEV1/FVC ratio of 81, TLC of 87% and DLCO of 92% predicted. She has had recent intentional weight loss.     Prior Cardiology consult indicated a history of Pulmonary fibrosis and Pulmonary hypertension. Her pulmonary pressures have improved since she has been on supplemental oxygen. She is on Lasix which is managed by Dr. Pierce, Cardiology. She has a history of respiratory failure and MRSA pneumonia with ARDS in 2009. Chest xray 9/2015 indicated stable scarring. Echo in 10/2015 indicates an EF of 70% with grade I diastolic dysfunction and an estimated RVSP of 40 mmHg. CTA 10/2015 indicated no evidence of PE. There is underlying areas of non specific fibrosis and bronchiectasis, similar to CT in 2011. She does have a flutter valve  for mucous clearance. Chest xray April 2016 indicated bilateral areas of linear increased density with architectural distortion to be due to scarring or interstitial lung disease.    She had an exacerbation in March 2016 requiring antibiotics and steroids. Sputum cultures and Immunoglobulin levels were ordered. IgM, IgG and IgA levels are WNL. Sputum indicated heavy growth normal upper respiratory david. AFB is negative and Fungal notes rare candida, likely contaminate.     She states she has had an increased wheeze the past 4 days. She feels it is allergy triggered. She just started on a Prednisone taper yesterday and is feeling better. She notes an occasional cough, but her mucous remains clear. She feels her dyspnea has been worse the past few days. She denies any fevers or chills.         Past Medical History:   Diagnosis Date   • ARDS (adult respiratory distress syndrome) (CMS-[facility])    • ARDS (adult respiratory distress syndrome) (CMS-[facility])    • Arrhythmia    • Arthritis     spine, knees   • ASTHMA    • Backpain     L2 Compression Fx (chronic -Morphine 60mg x6 tabs qhs)    • Clostridium difficile diarrhea 2012   • Depression    • Gall stones    • GERD (gastroesophageal reflux disease)    • Hx MRSA infection    • MRSA (methicillin resistant Staphylococcus aureus)    • MRSA infection    • O2 dependent    • Psychiatric problem    • Pulmonary fibrosis (CMS-[facility])     Stan   • Pulmonary HTN    • Sleep apnea        Past Surgical History:   Procedure Laterality Date   • RECTUS REPAIR  12/28/2011    Performed by AUGUST OREILLY at SURGERY SAME DAY [facility]   • ORBITAL FRACTURE ORIF  8/23/2011    Performed by JANIE GEORGES at SURGERY SAME DAY [facility]   • ORBITAL FRACTURE ORIF  7/26/2011    Performed by JANIE GEORGES at SURGERY [facility]   • GASTROSTOMY LAPAROSCOPIC  9/12/2009    Performed by IGNACIO DEGROOT at SURGERY [facility]   • TRACHEOSTOMY  9/3/2009    Performed by RIC SINGLETARY  at SURGERY [facility]   • OTHER  8/09    chest tube, trach, g-tube   • OTHER  1986    skin graft to leg   • OTHER ORTHOPEDIC SURGERY      2 hip repl orbital plates       History reviewed. No pertinent family history.    Social History     Social History   • Marital status:      Spouse name: N/A   • Number of children: N/A   • Years of education: N/A     Occupational History   • Not on file.     Social History Main Topics   • Smoking status: Never Smoker   • Smokeless tobacco: Never Used   • Alcohol use 0.0 - 1.2 oz/week      Comment: rarely   • Drug use: No   • Sexual activity: Not on file     Other Topics Concern   • Not on file     Social History Narrative   • No narrative on file         ROS:  Constitutional: Denies fevers, chills, sweats,  weight loss  Eyes: Denies glasses, vision loss, pain, drainage, double vision  Ears/Nose/Mouth/Throat: Denies rhinitis, nasal congestion, ear ache, difficulty hearing, sore throat, persistent hoarseness, decayed teeth/toothache  Cardiovascular: Denies chest pain, tightness, palpitations, fainting, difficulty breathing when laying down  Respiratory: See HPI   GI: Denies heartburn, difficulty swallowing, nausea, vomiting, abdominal pain, diarrhea, constipation  : Denies frequent urination, painful urination  Integumentary: Denies rashes, lumps or color changes  MSK: Denies painful joints, sore muscles, and back pain.   Neurological: Denies frequent headaches, dizziness, weakness  Sleep: See HPI     Current Outpatient Prescriptions   Medication Sig Dispense Refill   • predniSONE (DELTASONE) 10 MG Tab Take 40mg x 4 days, then take 30mg x 4 days, then take 20mg x 4 days, then 10mg x 4 days with food, then discontinue. 40 Tab 1   • gabapentin (NEURONTIN) 300 MG Cap Take 1 Cap by mouth 4 times a day. 120 Cap 3   • lidocaine (LIDODERM) 5 % Patch Apply 1 Patch to skin as directed every 24 hours. For shingles pain 30 Patch 1   • furosemide (LASIX) 20 MG Tab Take 1-2 Tabs  by mouth 1 time daily as needed (swelling). 120 Tab 0   • SPIRIVA HANDIHALER 18 MCG Cap inhale contents of 1 capsule daily, using handihaler 30 Cap 5   • nystatin (MYCOSTATIN) 833460 UNIT/GM Cream topical cream Apply topically twice daily to affected areas 1 Tube 1   • triamcinolone acetonide (KENALOG) 0.1 % Cream Apply to affected area twice a day 1 Tube 1   • mupirocin (BACTROBAN) 2 % Ointment Apply to affected areas every day 1 Tube 2   • promethazine (PHENERGAN) 25 MG Tab Take one by mouth everyday 30 Tab 1   • ADVAIR DISKUS 500-50 MCG/DOSE AEROSOL POWDER, BREATH ACTIVATED INHALE 1 PUFF BY MOUTH 2 TIMES A DAY, rinse mouth ater each use 3 Inhaler 3   • potassium Chloride ER (K-TAB) 20 MEQ Tab CR tablet Take 20 mEq by mouth 2 times a day.     • ranitidine (ZANTAC) 150 MG TABS Take 150 mg by mouth 2 times a day.     • cholestyramine (QUESTRAN) 4 G packet Take 1 Packet by mouth every day.     • albuterol (VENTOLIN OR PROVENTIL) 108 (90 BASE) MCG/ACT AERS Inhale 2 Puffs by mouth every 6 hours as needed.     • therapeutic multivitamin-minerals (THERAGRAN-M) TABS Take 1 Tab by mouth every day.     • albuterol (PROVENTIL) 2.5mg/3ml NEBU Inhale 2.5 mg mist 4 times a day.     • ondansetron (ZOFRAN) 4 MG TABS Take 4 mg by mouth every four hours as needed for Nausea/Vomiting. As needed for nausea or vomiting.      • nitroglycerin (NITROSTAT) 0.4 MG SL Tab Place 0.4 mg under tongue every 5 minutes as needed for Chest Pain.     • tramadol (ULTRAM) 50 MG Tab Take 1 Tab by mouth every 8 hours as needed for Moderate Pain or Severe Pain. 30 Tab 0   • levofloxacin (LEVAQUIN) 500 MG tablet Take 1 Tab by mouth every day. Take until gone. 14 Tab 1   • guaifenesin-codeine (CHERATUSSIN AC) Solution oral solution Take 5 mL by mouth every 6 hours as needed for Cough. 300 mL 0   • oxycodone-acetaminophen (PERCOCET) 5-325 MG Tab Take 1-2 Tabs by mouth every four hours as needed. 15 Tab 0   • AFLURIA PRESERVATIVE FREE 0.5 ML Suspension  "Prefilled Syringe inject 0.5 milliliter intramuscularly  0   • valacyclovir (VALTREX) 1 GM Tab Take 1 Tab by mouth 3 times a day. 21 Tab 0   • oxycodone-acetaminophen (PERCOCET) 5-325 MG Tab Take 1-2 Tabs by mouth every four hours as needed (Pain). 20 Tab 0   • Non Formulary Request Uses 3 liters 02 at rest 5-6 liters with exertion     • aspirin (ASA) 325 MG TABS Take 325 mg by mouth Once.     • cyclobenzaprine (FLEXERIL) 10 MG TABS Take  by mouth.     • NON SPECIFIED O2 3-4L/nc at rest; 5-6L/nc with exertion       No current facility-administered medications for this visit.        Allergies   Allergen Reactions   • Tape Rash     rash   • Clindamycin      C-Diff (Intestinal Infection)       Blood pressure 130/72, pulse 85, resp. rate 16, height 1.702 m (5' 7\"), weight 95.3 kg (210 lb), last menstrual period 05/28/2008, SpO2 95 %.    PE:   Appearance: Well developed, well nourished, no acute distress  Eyes: PERRL, EOM intact, sclera white, conjunctiva moist  Ears: no lesions or deformities  Hearing: grossly intact  Nose: no lesions or deformities  Oropharynx: tongue normal, posterior pharynx without erythema or exudate  Mallampati Classification: class 4  Neck: supple, trachea midline, no masses   Respiratory effort: no intercostal retractions or use of accessory muscles  Lung auscultation: no rales, rhonchi or wheezes  Heart auscultation: no murmur rub or gallop  Extremities: no cyanosis or edema  Abdomen: soft ,non tender, no masses  Gait and Station: normal  Digits and nails: no clubbing, cyanosis, petechiae or nodes.  Cranial nerves: grossly intact  Skin: no rashes, lesions or ulcers noted  Orientation: Oriented to time, person and place  Mood and affect: mood and affect appropriate, normal interaction with examiner  Judgement: Intact          Assessment:  1. Mild persistent asthma without complication     2. Interstitial lung disease (CMS-HCC)     3. Hypoxemia     4. ELIA (obstructive sleep apnea)     5. BMI " 32.0-32.9,adult     6. Cough           Plan:    1) Continue Advair 500/50, Spiriva, Albuterol per neb and HFA inhaler. Continue Mucinex OTC and Albuterol nebulizer qid prn to assist with mucous clearance. Complete current Prednisone taper.   2) She does have an RX for Levaquin 500 mg 1 po daily x 10 days and Prednisone taper on hand.    3) Continue to follow with Cardiology for diuretic management. Request consult records from Dr. Pierce, Cardiology.    4) RX for Cheratussin A/C 5 ml po q 6 hours prn cough to have on hand.    5) Continue 02 3 LPM 24/7, 5 LPM with exertion.  6) Ongoing weight loss efforts encourage. Encouraged routine walking/exercise.  7) She does have a flutter valve on hand to help with mucous clearance.  8) Continue CPAP at 12 CM H20 with 3 LPM 02 bleed in.   9) She is up to date on her Prevnar 13 and Pneumovax 23 vaccines. Updated Influenza vaccine today in the office.   10) Follow up in 6 months, sooner if needed.

## 2017-11-27 DIAGNOSIS — J45.909 UNCOMPLICATED ASTHMA: ICD-10-CM

## 2017-11-27 NOTE — TELEPHONE ENCOUNTER
Have we ever prescribed this med? Yes.  If yes, what date? 8/16/16    Last OV: 11/22/17- Jeimy Craig    Next OV: 5/24/18-Jeimy Craig     DX: Asthma     Medications: Advair

## 2018-01-01 ENCOUNTER — OFFICE VISIT (OUTPATIENT)
Dept: PULMONOLOGY | Facility: HOSPICE | Age: 63
End: 2018-01-01
Payer: MEDICARE

## 2018-01-01 ENCOUNTER — TELEPHONE (OUTPATIENT)
Dept: PULMONOLOGY | Facility: HOSPICE | Age: 63
End: 2018-01-01

## 2018-01-01 ENCOUNTER — NON-PROVIDER VISIT (OUTPATIENT)
Dept: PULMONOLOGY | Facility: HOSPICE | Age: 63
End: 2018-01-01
Payer: MEDICARE

## 2018-01-01 ENCOUNTER — APPOINTMENT (OUTPATIENT)
Dept: PULMONOLOGY | Facility: HOSPICE | Age: 63
End: 2018-01-01
Payer: MEDICARE

## 2018-01-01 VITALS
BODY MASS INDEX: 36.88 KG/M2 | DIASTOLIC BLOOD PRESSURE: 80 MMHG | OXYGEN SATURATION: 94 % | WEIGHT: 235 LBS | HEIGHT: 67 IN | HEART RATE: 96 BPM | RESPIRATION RATE: 16 BRPM | TEMPERATURE: 97.2 F | SYSTOLIC BLOOD PRESSURE: 146 MMHG

## 2018-01-01 VITALS
HEIGHT: 67 IN | TEMPERATURE: 98.6 F | BODY MASS INDEX: 37.39 KG/M2 | RESPIRATION RATE: 16 BRPM | WEIGHT: 238.2 LBS | DIASTOLIC BLOOD PRESSURE: 72 MMHG | SYSTOLIC BLOOD PRESSURE: 136 MMHG | HEART RATE: 83 BPM | OXYGEN SATURATION: 94 %

## 2018-01-01 VITALS
TEMPERATURE: 97.3 F | OXYGEN SATURATION: 90 % | HEART RATE: 107 BPM | WEIGHT: 220 LBS | SYSTOLIC BLOOD PRESSURE: 118 MMHG | DIASTOLIC BLOOD PRESSURE: 72 MMHG | HEIGHT: 67 IN | BODY MASS INDEX: 34.53 KG/M2

## 2018-01-01 DIAGNOSIS — J84.9 ILD (INTERSTITIAL LUNG DISEASE) (HCC): ICD-10-CM

## 2018-01-01 DIAGNOSIS — R09.02 HYPOXEMIA: ICD-10-CM

## 2018-01-01 DIAGNOSIS — J84.9 INTERSTITIAL LUNG DISEASE (HCC): ICD-10-CM

## 2018-01-01 DIAGNOSIS — R05.9 COUGH: ICD-10-CM

## 2018-01-01 DIAGNOSIS — J45.909 UNCOMPLICATED ASTHMA: ICD-10-CM

## 2018-01-01 DIAGNOSIS — G47.33 OSA (OBSTRUCTIVE SLEEP APNEA): ICD-10-CM

## 2018-01-01 DIAGNOSIS — J45.30 MILD PERSISTENT ASTHMA WITHOUT COMPLICATION: ICD-10-CM

## 2018-01-01 DIAGNOSIS — J45.40 MODERATE PERSISTENT ASTHMA WITHOUT COMPLICATION: ICD-10-CM

## 2018-01-01 DIAGNOSIS — B37.0 THRUSH: ICD-10-CM

## 2018-01-01 DIAGNOSIS — J45.41 MODERATE PERSISTENT ASTHMA WITH ACUTE EXACERBATION: ICD-10-CM

## 2018-01-01 DIAGNOSIS — Z00.00 HEALTH CARE MAINTENANCE: ICD-10-CM

## 2018-01-01 PROCEDURE — 90686 IIV4 VACC NO PRSV 0.5 ML IM: CPT | Performed by: PHYSICIAN ASSISTANT

## 2018-01-01 PROCEDURE — 94729 DIFFUSING CAPACITY: CPT | Performed by: INTERNAL MEDICINE

## 2018-01-01 PROCEDURE — 99213 OFFICE O/P EST LOW 20 MIN: CPT | Mod: 25 | Performed by: PHYSICIAN ASSISTANT

## 2018-01-01 PROCEDURE — 94060 EVALUATION OF WHEEZING: CPT | Performed by: INTERNAL MEDICINE

## 2018-01-01 PROCEDURE — G0008 ADMIN INFLUENZA VIRUS VAC: HCPCS | Performed by: PHYSICIAN ASSISTANT

## 2018-01-01 PROCEDURE — 94726 PLETHYSMOGRAPHY LUNG VOLUMES: CPT | Performed by: INTERNAL MEDICINE

## 2018-01-01 PROCEDURE — 99214 OFFICE O/P EST MOD 30 MIN: CPT | Performed by: NURSE PRACTITIONER

## 2018-01-01 RX ORDER — CODEINE PHOSPHATE AND GUAIFENESIN 10; 100 MG/5ML; MG/5ML
5 SOLUTION ORAL EVERY 6 HOURS PRN
Qty: 240 ML | Refills: 0 | Status: SHIPPED | OUTPATIENT
Start: 2018-01-01 | End: 2019-01-01

## 2018-01-01 RX ORDER — DOXYCYCLINE HYCLATE 100 MG/1
100 CAPSULE ORAL 2 TIMES DAILY
Qty: 20 CAP | Refills: 1 | Status: SHIPPED
Start: 2018-01-01

## 2018-01-01 RX ORDER — PREDNISONE 10 MG/1
TABLET ORAL
Qty: 40 TAB | Refills: 1 | Status: SHIPPED | OUTPATIENT
Start: 2018-01-01 | End: 2018-01-01

## 2018-01-01 RX ORDER — MONTELUKAST SODIUM 10 MG/1
10 TABLET ORAL EVERY EVENING
Qty: 30 TAB | Refills: 11 | Status: SHIPPED | OUTPATIENT
Start: 2018-01-01

## 2018-01-01 RX ORDER — ALBUTEROL SULFATE 2.5 MG/3ML
2.5 SOLUTION RESPIRATORY (INHALATION) 4 TIMES DAILY
Qty: 30 BULLET | Refills: 12 | Status: SHIPPED | OUTPATIENT
Start: 2018-01-01

## 2018-01-01 RX ORDER — TIOTROPIUM BROMIDE 18 UG/1
CAPSULE ORAL; RESPIRATORY (INHALATION)
Qty: 30 CAP | Refills: 11 | Status: SHIPPED | OUTPATIENT
Start: 2018-01-01

## 2018-01-01 RX ORDER — DOXYCYCLINE HYCLATE 100 MG/1
100 CAPSULE ORAL 2 TIMES DAILY
Qty: 20 CAP | Refills: 1 | Status: SHIPPED | OUTPATIENT
Start: 2018-01-01 | End: 2018-01-01

## 2018-01-01 RX ORDER — PREDNISONE 10 MG/1
TABLET ORAL
Qty: 30 TAB | Refills: 1 | Status: SHIPPED | OUTPATIENT
Start: 2018-01-01 | End: 2018-01-01

## 2018-01-01 RX ORDER — PREDNISONE 10 MG/1
TABLET ORAL
Qty: 18 TAB | Refills: 1 | Status: SHIPPED | OUTPATIENT
Start: 2018-01-01 | End: 2018-01-01

## 2018-01-01 RX ORDER — PREDNISONE 10 MG/1
TABLET ORAL
Qty: 30 TAB | Refills: 1 | Status: SHIPPED
Start: 2018-01-01

## 2018-01-01 RX ORDER — CODEINE PHOSPHATE AND GUAIFENESIN 10; 100 MG/5ML; MG/5ML
5 SOLUTION ORAL EVERY 6 HOURS PRN
Qty: 300 ML | Refills: 0 | Status: SHIPPED | OUTPATIENT
Start: 2018-01-01 | End: 2018-01-01

## 2018-01-01 RX ORDER — ALBUTEROL SULFATE 90 UG/1
2 AEROSOL, METERED RESPIRATORY (INHALATION) EVERY 6 HOURS PRN
Qty: 8.5 G | Refills: 6 | Status: SHIPPED | OUTPATIENT
Start: 2018-01-01

## 2018-01-01 RX ORDER — LEVOFLOXACIN 500 MG/1
500 TABLET, FILM COATED ORAL DAILY
Qty: 10 TAB | Refills: 1 | Status: SHIPPED | OUTPATIENT
Start: 2018-01-01 | End: 2018-01-01

## 2018-01-01 ASSESSMENT — PULMONARY FUNCTION TESTS
FEV1/FVC_PERCENT_PREDICTED: 102
FEV1/FVC_PERCENT_PREDICTED: 102
FVC_LLN: 3.04
FEV1/FVC: 79
FEV1: 2.2
FEV1/FVC: 80
FEV1_PERCENT_CHANGE: 10
FEV1/FVC_PERCENT_PREDICTED: 77
FVC_PERCENT_PREDICTED: 76
FEV1/FVC: 78.36
FEV1_LLN: 2.35
FEV1/FVC_PERCENT_CHANGE: 80
FEV1_PERCENT_PREDICTED: 78
FEV1_PERCENT_CHANGE: 8
FVC_PREDICTED: 3.64
FVC: 3.05
FEV1/FVC_PERCENT_PREDICTED: 103
FEV1/FVC_PERCENT_PREDICTED: 100
FEV1/FVC_PREDICTED: 78
FEV1_PREDICTED: 2.81
FVC_PERCENT_PREDICTED: 83
FEV1/FVC: 78
FEV1/FVC_PERCENT_LLN: 65
FEV1_PERCENT_PREDICTED: 85
FEV1/FVC_PERCENT_CHANGE: -1
FVC: 2.77
FEV1: 2.39

## 2018-02-07 NOTE — TELEPHONE ENCOUNTER
Have we ever prescribed this med? Yes.  If yes, what date?     Last OV: 11/22/2017 - Jeimy Craig    Next OV: 05/24/2018 - Jeimy Craig    DX: Asthma    Medications: Spiriva

## 2018-03-07 NOTE — TELEPHONE ENCOUNTER
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

## 2018-05-24 NOTE — PATIENT INSTRUCTIONS
Plan:       1) Continue Advair 500/50, Spiriva, Albuterol per neb and HFA inhaler. Continue Mucinex OTC and Albuterol nebulizer qid prn to assist with mucous clearance.   2) RX refill for Levaquin 500 mg 1 po daily x 10 days and Prednisone taper on hand. RX for nystatin s/s to have on hand.   3) Continue to follow with Cardiology for diuretic management.  4) RX for Cheratussin A/C 5 ml po q 6 hours prn cough to have on hand.  5) Continue 02 3 LPM 24/7, 5 LPM with exertion.  6) Ongoing weight loss efforts encourage. Encouraged routine walking/exercise.  7) She does have a flutter valve on hand to help with mucous clearance.  8) Continue CPAP at 12 CM H20 with 3 LPM 02 bleed in.   9) Patient is up to date on her Prevnar 13, Pneumovax 23 and Influenza vaccinations.  10) Continue OTC antihistamine. Add Singulair 10 mg 1 po qhs.   11) Follow up in 6 months with updated PFT's, sooner if needed.

## 2018-05-24 NOTE — PROGRESS NOTES
Chief Complaint   Patient presents with   • Asthma   • Apnea       HPI:  Tayler Osuna is a 62 y.o. year old female here today for follow-up on her Asthma and Obstructive sleep apnea. She is compliant on CPAP 12 CM with 3 LPM 02 bleed in. CNOX in the past on this setting indicated adequate saturations with a mean of 95.3%. Compliance card download today in the office indicates an AHI of 0.7 with an average use of over 6 hours at night. She is using her CPAP nightly and tolerates is well. She has nasal pillows which she feels are comfortable. She does feel she sleeps better on therapy and wakes more refreshed. She denies any morning headaches.      She is on 02 3 LPM during the day, but often requires 5-6 LPM with exertion. She is compliant on Advair, Spiriva, Albuterol per nebulizer and Proair HFA inhaler. She had been on Prednisone 10 mg in the past, but has been weaned off Prednisone all together. PFT's in June 2015 indicated an FEV1 of 2.50 L, 85% predicted with an FEV1/FVC ratio of 81, TLC of 82% and a DLCO of 89% predicted. These are stable from prior PFT's from 2013. PFT's were updated 9/15/2016 and indicated an FEV1 of 2.39 L, 83% predicted, TLC 79% and DLCO 97%. PFT's 11/22/2017 indicated an FEV1 of 2.69 L, 94% predicted with an FEV1/FVC ratio of 81, TLC of 87% and DLCO of 92% predicted. She has had recent intentional weight loss.      Prior Cardiology consult indicated a history of Pulmonary fibrosis and Pulmonary hypertension. Her pulmonary pressures have improved since she has been on supplemental oxygen. She is on Lasix which is managed by Dr. Pierce, Cardiology. She has a history of respiratory failure and MRSA pneumonia with ARDS in 2009. Chest xray 9/2015 indicated stable scarring. Echo in 10/2015 indicates an EF of 70% with grade I diastolic dysfunction and an estimated RVSP of 40 mmHg. CTA 10/2015 indicated no evidence of PE. There is underlying areas of non specific fibrosis and bronchiectasis,  similar to CT in 2011. She does have a flutter valve for mucous clearance. Chest xray April 2016 indicated bilateral areas of linear increased density with architectural distortion to be due to scarring or interstitial lung disease.    She had an exacerbation in March 2016 requiring antibiotics and steroids. Sputum cultures and Immunoglobulin levels were ordered. IgM, IgG and IgA levels are WNL. Sputum indicated heavy growth normal upper respiratory david. AFB is negative and Fungal notes rare candida, likely contaminate.      She states she had an episode of bronchitis in December 2017. She took her Levaquin and Prednisone she had on hand. She feels this worked well. She feels her chronic dyspnea is unchanged. She does have an increased cough. She notes occasional chest congestion. If she is able to produce mucous it is thick, but white. She denies significant sinus drainage. However she feels her cough may be allergy trigger. She feels her cough is often worse in the Spring and Fall months. She feels the ranitidine works well to control her reflux. She is on antibiotics currently for a dog bite to her left leg. She states she did Augmentin for 10 days and she is now on Bactrim. She is followed by wound care as well. She denies current wheezing. She denies current fevers or chills.          Past Medical History:   Diagnosis Date   • ARDS (adult respiratory distress syndrome) ([facility])    • ARDS (adult respiratory distress syndrome) ([facility])    • Arrhythmia    • Arthritis     spine, knees   • ASTHMA    • Backpain     L2 Compression Fx (chronic -Morphine 60mg x6 tabs qhs)    • Clostridium difficile diarrhea 2012   • Depression    • Gall stones    • GERD (gastroesophageal reflux disease)    • Hx MRSA infection    • MRSA (methicillin resistant Staphylococcus aureus)    • MRSA infection    • O2 dependent    • Psychiatric problem    • Pulmonary fibrosis ([facility])     Stan   • Pulmonary HTN ([facility])    • Sleep apnea        Past  Surgical History:   Procedure Laterality Date   • RECTUS REPAIR  12/28/2011    Performed by AUGUST OREILLY at SURGERY SAME DAY [facility]S   • ORBITAL FRACTURE ORIF  8/23/2011    Performed by JANIE GEORGES at SURGERY SAME DAY [facility]   • ORBITAL FRACTURE ORIF  7/26/2011    Performed by JANIE GEORGES at SURGERY [facility] ORS   • GASTROSTOMY LAPAROSCOPIC  9/12/2009    Performed by IGNACIO DEGROOT at SURGERY [facility] ORS   • TRACHEOSTOMY  9/3/2009    Performed by RIC SINGLETARY at SURGERY [facility] ORS   • OTHER  8/09    chest tube, trach, g-tube   • OTHER  1986    skin graft to leg   • OTHER ORTHOPEDIC SURGERY      2 hip repl orbital plates       History reviewed. No pertinent family history.    Social History     Social History   • Marital status:      Spouse name: N/A   • Number of children: N/A   • Years of education: N/A     Occupational History   • Not on file.     Social History Main Topics   • Smoking status: Never Smoker   • Smokeless tobacco: Never Used   • Alcohol use 0.0 - 1.2 oz/week      Comment: rarely   • Drug use: No   • Sexual activity: Not on file     Other Topics Concern   • Not on file     Social History Narrative   • No narrative on file       ROS:  Constitutional: Denies fevers, chills, sweats, fatigue, weight loss  Eyes: Denies glasses, vision loss, pain, drainage, double vision  Ears/Nose/Mouth/Throat: Denies rhinitis, nasal congestion, ear ache, difficulty hearing, sore throat, persistent hoarseness, decayed teeth/toothache  Cardiovascular: Denies chest pain, tightness, palpitations, swelling in feet/legs, fainting, difficulty breathing when laying down  Respiratory: See HPI   GI: Denies heartburn, difficulty swallowing, nausea, vomiting, abdominal pain, diarrhea, constipation  : Denies frequent urination, painful urination  Integumentary: Denies rashes, lumps or color changes  MSK: Denies painful joints, sore muscles, and back pain.   Neurological: Denies  frequent headaches, dizziness, weakness  Sleep: See HPI       Current Outpatient Prescriptions   Medication Sig Dispense Refill   • SPIRIVA HANDIHALER 18 MCG Cap INHALE THE CONTENTS OF ONE CAPSULE ONE TIME DAILY VIA HANDIHALER 30 Cap 11   • ADVAIR DISKUS 500-50 MCG/DOSE AEROSOL POWDER, BREATH ACTIVATED INHALE 1 PUFF BY MOUTH 2 TIMES A DAY. rinse mouth after each use 1 Inhaler 5   • predniSONE (DELTASONE) 10 MG Tab Take 40mg x 4 days, then take 30mg x 4 days, then take 20mg x 4 days, then 10mg x 4 days with food, then discontinue. 40 Tab 1   • guaifenesin-codeine (CHERATUSSIN AC) Solution oral solution Take 5 mL by mouth every 6 hours as needed for Cough. 300 mL 0   • lidocaine (LIDODERM) 5 % Patch Apply 1 Patch to skin as directed every 24 hours. For shingles pain 30 Patch 1   • furosemide (LASIX) 20 MG Tab Take 1-2 Tabs by mouth 1 time daily as needed (swelling). 120 Tab 0   • albuterol (VENTOLIN OR PROVENTIL) 108 (90 BASE) MCG/ACT AERS Inhale 2 Puffs by mouth every 6 hours as needed.     • albuterol (PROVENTIL) 2.5mg/3ml NEBU Inhale 2.5 mg mist 4 times a day.     • sulfamethoxazole-trimethoprim (BACTRIM DS) 800-160 MG tablet Take 1 Tab by mouth 2 times a day. 20 Tab 0   • Dextromethorphan-Quinidine (NUEDEXTA PO) Take  by mouth.     • NARATRIPTAN HCL PO Take  by mouth.     • promethazine (PHENERGAN) 25 MG Tab TAKE ONE TABLET BY MOUTH DAILY AS NEEDED  30 Tab 0   • nitroglycerin (NITROSTAT) 0.4 MG SL Tab Place 0.4 mg under tongue every 5 minutes as needed for Chest Pain.     • nystatin (MYCOSTATIN) 968901 UNIT/GM Cream topical cream Apply topically twice daily to affected areas 1 Tube 1   • mupirocin (BACTROBAN) 2 % Ointment Apply to affected areas every day 1 Tube 2   • potassium Chloride ER (K-TAB) 20 MEQ Tab CR tablet Take 20 mEq by mouth 2 times a day.     • Non Formulary Request Uses 3 liters 02 at rest 5-6 liters with exertion     • ranitidine (ZANTAC) 150 MG TABS Take 150 mg by mouth 2 times a day.     •  "cholestyramine (QUESTRAN) 4 G packet Take 1 Packet by mouth every day.     • therapeutic multivitamin-minerals (THERAGRAN-M) TABS Take 1 Tab by mouth every day.     • NON SPECIFIED O2 3-4L/nc at rest; 5-6L/nc with exertion       No current facility-administered medications for this visit.        Allergies   Allergen Reactions   • Tape Rash     rash   • Clindamycin      C-Diff (Intestinal Infection)       Blood pressure 118/72, pulse (!) 107, temperature 36.3 °C (97.3 °F), height 1.702 m (5' 7\"), weight 99.8 kg (220 lb), last menstrual period 05/28/2008, SpO2 90 %.    PE:   Appearance: Well developed, well nourished, no acute distress  Eyes: PERRL, EOM intact, sclera white, conjunctiva moist  Ears: no lesions or deformities  Hearing: grossly intact  Nose: no lesions or deformities  Oropharynx: tongue normal, posterior pharynx without erythema or exudate  Mallampati Classification: Class 4  Neck: supple, trachea midline, no masses   Respiratory effort: no intercostal retractions or use of accessory muscles  Lung auscultation: no rales, rhonchi or wheezes  Heart auscultation: no murmur rub or gallop  Extremities: no cyanosis or edema  Abdomen: soft ,non tender, no masses  Gait and Station: normal  Digits and nails: no clubbing, cyanosis, petechiae or nodes.  Cranial nerves: grossly intact  Skin: no rashes, lesions or ulcers noted  Orientation: Oriented to time, person and place  Mood and affect: mood and affect appropriate, normal interaction with examiner  Judgement: Intact          Assessment:    1. Mild persistent asthma without complication  montelukast (SINGULAIR) 10 MG Tab    levoFLOXacin (LEVAQUIN) 500 MG tablet    predniSONE (DELTASONE) 10 MG Tab    AMB PULMONARY FUNCTION TEST/LAB   2. Interstitial lung disease (HCC)  AMB PULMONARY FUNCTION TEST/LAB   3. Hypoxemia     4. BMI 34.0-34.9,adult  Patient identified as having weight management issue.  Appropriate orders and counseling given.   5. Cough  montelukast " (SINGULAIR) 10 MG Tab    guaifenesin-codeine (CHERATUSSIN AC) Solution oral solution   6. Thrush  nystatin (MYCOSTATIN) 292769 UNIT/ML Suspension         Plan:       1) Continue Advair 500/50, Spiriva, Albuterol per neb and HFA inhaler. Continue Mucinex OTC and Albuterol nebulizer qid prn to assist with mucous clearance.   2) RX refill for Levaquin 500 mg 1 po daily x 10 days and Prednisone taper on hand. RX for nystatin s/s to have on hand.   3) Continue to follow with Cardiology for diuretic management.  4) RX for Cheratussin A/C 5 ml po q 6 hours prn cough to have on hand.    5) Continue 02 3 LPM 24/7, 5 LPM with exertion.  6) Ongoing weight loss efforts encourage. Encouraged routine walking/exercise.  7) She does have a flutter valve on hand to help with mucous clearance.  8) Continue CPAP at 12 CM H20 with 3 LPM 02 bleed in.   9) Patient is up to date on her Prevnar 13, Pneumovax 23 and Influenza vaccinations.  10) Continue OTC antihistamine. Add Singulair 10 mg 1 po qhs.   11) Follow up in 6 months with updated PFT's, sooner if needed.        Narcotics: 180 (Value from [employer].)    Sedatives: 80 (Value from [employer].)    Stimulants: 0 (Value from [employer].)     NARxSCORES can range from 000 to 999. This first two digits represent the composite percentile risk based on an overall analysis of prescription drug use. The third digit represents the number of active prescriptions. The distribution of scores in the population is such that approximately 75% fall below 200, 95% fall below 500 and 99% fall below 650.     SELECT THE LINK BELOW TO REVIEW THE [employer] REPORT  or  SELECT THE 'ACCEPT' BUTTON TO CONTINUTE AFTER REVIEWING THE SCORES

## 2018-07-24 NOTE — TELEPHONE ENCOUNTER
"Received a call from a Mally requesting info on patients oxygen use and wanted to know Jeimy Taylor NPI - no company name left  Called to get company name and informed Mally I would need to verify with patient before I could release any information    LM for patient to call and verify if she wants to use \"[employer]\"    [employer] [facility] [place]   - [phone]  Fax- [phone]      "

## 2018-08-22 NOTE — TELEPHONE ENCOUNTER
Pharmacy is requesting a refill for Prednisone 10 MG.    Last seen: 5/24/18- Jeimy Craig  Next ov: 11/28/18  Interstitial lung disease

## 2018-08-23 NOTE — TELEPHONE ENCOUNTER
Pt notified on vm that rx Prednisone was sent to the pharmacy on file and cb [phone] if she has any questions.

## 2018-11-08 NOTE — PATIENT INSTRUCTIONS
1-doxycycline for purulent secretions  2-steroid taper  3-emergency sj provided  4-increase home flutter valve use (acapella) at least bid (preferrably after neb use)  5-continue spiriva, advair and prn albuterol MDI or neb  6-consistent ranitidine use  7-follow up appointment 6 weeks with PFTs  8-flu shot today

## 2018-11-08 NOTE — PROGRESS NOTES
CC    HPI:  Tayler Osuna is a 63 y.o. year old female here today for sick visit.  Patient had completed a course of Levaquin on 10/31/2018 and was on a taper with initial improvement in respiratory symptoms but signs and symptoms of exacerbation developed when patient weaned to 10 mg on 7 November.  Patient requires oxygen at 3 L/min at rest and bled into her CPAP at night with 5-6 L O2 needed with activity.  Patient did not bring her compliance chip today but will to her follow-up appointment.  Patient reports low-grade fever and night sweats with increased fatigue and purulent yellow secretions.  Patient did have a follow up appointment with Jeimy Craig November 20 to include pulmonary function testing will defer for 6-8 weeks.  In reviewing her antibiotic requirements over the last 6 months, it appears patient was on Levaquin 5/24, 8/21, 10/21 she was also on Bactrim beginning on 5/17 and Augmentin beginning on 5/6 for dog bite.  Reviewed vital signs sat was 93 on 3 L O2, blood pressure was 136/72 and her BMI was 37.31 up from 34.46 at last visit with our office in May 2018 and 32.89 one year ago.  As discussed at previous visit with Jeimy, monitor portion sizes, menu choices, increase activity as tolerated.  Patient states she is active walking regularly and riding horses.  Reviewed patient home respiratory regime which includes Spiriva, Advair 500-50, Singulair, frequent albuterol MDI use at 3-4 times per day but patient also has an albuterol nebulizer which would be beneficial to use in conjunction with her flutter valve to aid in secretion clearance.  Did review equipment cleaning of her flutter valve, CPAP tubing and spacer. Patient takes daily Lasix.  She does have follow-up with cardiology tomorrow for pulmonary hypertension.  Patient was prescribed 10-day supply of cough medicine with codeine yesterday by PCP to be used at at bedtime.    ROS:   Constitutional: Patient reports increased fatigue,  night sweats, reports low-grade fever at 99.9 F, denies chills or unintentional weight loss  Skin: Denies rashes, hair or nail changes, lumps, sores, mole on mid back, follow-up PCP or Derm  Eyes: Wears reading glasses, denies sudden onset blurring or other vision changes  ENT: History of allergies, denies dentures, sinus infections, hoarseness, runny nose, reports mild occasional earaches and new nasal congestion as of 11/8/2018  GI: Ongoing issues with heartburn/reflux patient states well controlled with ranitidine, denies trouble swallowing  Respiratory: Productive cough with thick yellow to yellow-green secretions, wheezing, shortness of breath with any activity, reports some chest tightness with coughing  CV: Denies history of murmurs, chest pain, pressure with the exception of esophageal spasms, denies palpitations, reports mild edema lower extremities    Past Medical History:   Diagnosis Date   • ARDS (adult respiratory distress syndrome) ([facility])    • ARDS (adult respiratory distress syndrome) ([facility])    • Arrhythmia    • Arthritis     spine, knees   • ASTHMA    • Backpain     L2 Compression Fx (chronic -Morphine 60mg x6 tabs qhs)    • Clostridium difficile diarrhea 2012   • Depression    • Gall stones    • GERD (gastroesophageal reflux disease)    • Hx MRSA infection    • MRSA (methicillin resistant Staphylococcus aureus)    • MRSA infection    • O2 dependent    • Psychiatric problem    • Pulmonary fibrosis ([facility])     Pierce   • Pulmonary HTN ([facility])    • Sleep apnea        Past Surgical History:   Procedure Laterality Date   • RECTUS REPAIR  12/28/2011    Performed by AUGUST OREILLY at SURGERY SAME DAY [facility]   • ORBITAL FRACTURE ORIF  8/23/2011    Performed by JANIE GEORGES at SURGERY SAME DAY [facility]   • ORBITAL FRACTURE ORIF  7/26/2011    Performed by JANIE GEORGES at SURGERY [facility] ORS   • GASTROSTOMY LAPAROSCOPIC  9/12/2009    Performed by IGNACIO DEGROOT at SURGERY [facility] ORS  "  • TRACHEOSTOMY  9/3/2009    Performed by RIC SINGLETARY at SURGERY [facility]S   • OTHER  8/09    chest tube, trach, g-tube   • OTHER  1986    skin graft to leg   • OTHER ORTHOPEDIC SURGERY      2 hip repl orbital plates       History reviewed. No pertinent family history.    Social History     Social History   • Marital status:      Spouse name: N/A   • Number of children: N/A   • Years of education: N/A     Occupational History   • Not on file.     Social History Main Topics   • Smoking status: Never Smoker   • Smokeless tobacco: Never Used   • Alcohol use 0.0 - 1.2 oz/week      Comment: rarely   • Drug use: No   • Sexual activity: Not on file     Other Topics Concern   • Not on file     Social History Narrative   • No narrative on file       Allergies as of 11/08/2018 - Reviewed 11/08/2018   Allergen Reaction Noted   • Tape Rash 07/01/2015   • Clindamycin  12/23/2011        @Vital signs for this encounter:  Vitals:    11/08/18 1036 11/08/18 1039   Height: 1.702 m (5' 7\")    Weight: 108 kg (238 lb 3.2 oz)    Weight % change since last entry.: 0 %    BP: 136/72    Pulse: 83    BMI (Calculated): 37.31    Resp: 16    Temp: 37 °C (98.6 °F)    TempSrc: Oral    O2 sat % on O2:  95 %   O2 Flow Rate (L/min):  3       Current medications as of today   Current Outpatient Prescriptions   Medication Sig Dispense Refill   • doxycycline (VIBRAMYCIN) 100 MG Cap Take 1 Cap by mouth 2 times a day. Take until gone. 20 Cap 1   • predniSONE (DELTASONE) 10 MG Tab Take 30mg x 3 days, then take 20mg x 3 days, then take 10mg x 3 days, with food, then discontinue. 18 Tab 1   • fluticasone-salmeterol (ADVAIR DISKUS) 500-50 MCG/DOSE AEROSOL POWDER, BREATH ACTIVATED INHALE 1 PUFF BY MOUTH 2 TIMES A DAY. rinse mouth after each use 1 Inhaler 5   • albuterol (PROVENTIL) 2.5mg/3ml Nebu Soln solution for nebulization 3 mL by Nebulization route 4 times a day. 30 Bullet 12   • albuterol 108 (90 Base) MCG/ACT Aero Soln inhalation " aerosol Inhale 2 Puffs by mouth every 6 hours as needed. 8.5 g 6   • predniSONE (DELTASONE) 10 MG Tab Take 40mg x 4 days, then take 30mg x 4 days, then take 20mg x 4 days, then 10mg x 4 days with food, then discontinue. 40 Tab 1   • montelukast (SINGULAIR) 10 MG Tab Take 1 Tab by mouth every evening. 30 Tab 11   • Dextromethorphan-Quinidine (NUEDEXTA PO) Take  by mouth.     • SPIRIVA HANDIHALER 18 MCG Cap INHALE THE CONTENTS OF ONE CAPSULE ONE TIME DAILY VIA HANDIHALER 30 Cap 11   • ranitidine (ZANTAC) 150 MG TABS Take 150 mg by mouth 2 times a day.     • therapeutic multivitamin-minerals (THERAGRAN-M) TABS Take 1 Tab by mouth every day.     • tramadol (ULTRAM) 50 MG Tab Take 50 mg by mouth every four hours as needed.     • NARATRIPTAN HCL PO Take  by mouth.     • nitroglycerin (NITROSTAT) 0.4 MG SL Tab Place 0.4 mg under tongue every 5 minutes as needed for Chest Pain.     • furosemide (LASIX) 20 MG Tab Take 1-2 Tabs by mouth 1 time daily as needed (swelling). 120 Tab 0   • nystatin (MYCOSTATIN) 257789 UNIT/GM Cream topical cream Apply topically twice daily to affected areas 1 Tube 1   • mupirocin (BACTROBAN) 2 % Ointment Apply to affected areas every day 1 Tube 2   • potassium Chloride ER (K-TAB) 20 MEQ Tab CR tablet Take 20 mEq by mouth 2 times a day.     • Non Formulary Request Uses 3 liters 02 at rest 5-6 liters with exertion     • cholestyramine (QUESTRAN) 4 G packet Take 1 Packet by mouth every day.       No current facility-administered medications for this visit.          Physical Exam:   Gen:           Alert and oriented, No apparent distress. Mood and affect appropriate, normal interaction with provider.  Eyes:          sclere white, conjunctive moist.  Hearing:     Grossly intact.  Dentition:    Good dentition as best visualized  Oropharynx:   Difficult to visualize, tongue appears normal, posterior pharynx appears without erythema or exudate.  Mallampati Classification: IV  Neck:        Supple, trachea  midline, no masses.  Respiratory Effort: No intercostal retractions or use of accessory muscles.   Lung Auscultation:      Crackles lower lobes right greater than left, otherwise clear to auscultation; no, rhonchi or wheezing.  CV:            Regular rate and rhythm. No murmurs, rubs or gallops appreciated.  Digits, Nails, Ext: No clubbing, cyanosis, petechiae, or nodes.   Skin:        No rashes, lesions or ulcers noted on back or exposed skin surfaces.                     Assessment:  1. Moderate persistent asthma with acute exacerbation     2. Mild persistent asthma without complication  fluticasone-salmeterol (ADVAIR DISKUS) 500-50 MCG/DOSE AEROSOL POWDER, BREATH ACTIVATED   3. Interstitial lung disease (HCC)     4. Health care maintenance  Influenza Vaccine Quad Injection >3Y (PF)   5. Hypoxemia     6. ELIA (obstructive sleep apnea)         Immunizations:    Flu: 11/8/2018  Pneumovax 23: 8/9/2016   Prevnar 13: 6/1/2015    Plan:    1-doxycycline for purulent secretions  2-steroid taper  3-emergency sj provided  4-increase home flutter valve use (acapella) at least bid (preferrably after neb use)  5-continue spiriva, advair and prn albuterol MDI or neb  6-consistent ranitidine use  7-follow up appointment 6 weeks with PFTs  8-flu shot today    This dictation was created using voice recognition software. The accuracy of the dictation is limited to the abilities of the software. I expect there may be some errors of grammar and possibly content.

## 2018-12-26 NOTE — PATIENT INSTRUCTIONS
Plan:    1) PFT's reviewed. Overall stable. She is feeling better. She does still have a cough which has been chronic for her. Discussed importance of mucous clearing. She will continue Advair, Spiriva and Albuterol per nebulizer and HFA inhaler. Continue Mucinex OTC. She was provided a refill for Cheratussin AC to have on hand. She does have a Flutter valve on hand to assist in mucous clearance.   2) Discussed importance of prompt treatment of acute infectious symptoms. RX for Doxycycline and Prednisone taper provided to have on hand. She does have a prescription for Levaquin on hand as well.   3) Continue to follow with Cardiology for diuretic management. She states she is pending an updated Echo and follow up with Dr. Pierce at [facility]. Will request copy of testing and consult note.   4) Continue 02 3 LPM 24/7. 5 LPM with exertion.  5) Ongoing weight loss efforts encouraged. Encouraged routine walking/exercise.   6) Patient is up to date on her Prevnar 13, Pneumovax 23 and Influenza vaccinations.  7) Continue OTC antihistamine prn. Continue Singulair 10 mg qhs.   8) Continue CPAP at 12 CM H20 with 3 LPM 02 bleed in. Sleep hygiene discussed.   9) Follow up in 6 months, sooner if needed.     She is requesting clearance for an upcoming hemicolectomy with Dr. Becerra. PFT's are stable. Chest xray from 12/2/2018 is stable as well. She is cleared from a Pulmonary standpoint for needed upcoming surgery. She is encouraged early mobilization, cough and deep breathing, routine use of nebulizer and use of Incentive Spirometer post op. She is also encouraged to bring her CPAP machine with her to the hospital.

## 2018-12-26 NOTE — PROCEDURES
Good patient effort & cooperation.  The results of this test meet the ATS/ERS standards for acceptability and repeatability.  Predicted equations for Spirometry are N-Jess II, ITS for lung volumes, and [facility] for DLCO.  The DLCO was uncorrected for Hgb.  A bronchodilator of Ventolin HFA- 2puffs via spacer were administered.  DLCO was performed during dilation period.    1. Baseline spirometry demonstrates a mild reduction in FEV1 and FVC. FEV1/FVC ratio is normal.    2. After administration of an inhaled bronchodilator there is 8% improvement in FEV1.    3. Lung volumes demonstrate a normal total lung capacity at 95% of predicted.    4. Gas exchange as estimated by DLCO is normal at 112% of predicted.    5. Airway resistance is normal      Impression:    This study demonstrates the presence of mild obstructive lung disease as suggested by partial reversibility.  No evidence of significant restriction is noted.

## 2018-12-26 NOTE — LETTER
ANDREI Paul  [facility] Pulmonary Medicine   [address] [place] [address] [place] [address]  Phone: [phone] - Fax: [phone]           Encounter Date: 12/26/2018  Provider: ANDREI Paul  Location of Care: [facility] PULMONARY MEDICINE      Patient:   Tayler Osuna   MR Number: [id number]   YOB: 1955     PROGRESS NOTE:  Chief Complaint   Patient presents with   • Results     PFT   • Asthma   • Apnea   • Hypoxemia       HPI:  Tayler Osuna is a 63 y.o. year old female here today for follow-up on her Asthma and Obstructive sleep apnea. She is here to review results of updated PFT's.     She is compliant on CPAP 12 CM with 3 LPM 02 bleed in. CNOX in the past on this setting indicated adequate saturations with a mean of 95.3%. Compliance card download today in the office indicates an AHI of 0.7 with an average use of 8 hours at night. She tolerates the pressure well. She has nasal pillows which she feels are comfortable. She does feel she sleeps better on therapy and wakes more refreshed. She denies any morning headaches.      She is on 02 3 LPM during the day, but often requires 5-6 LPM with exertion. She is compliant on Advair, Spiriva, Albuterol per nebulizer and Proair HFA inhaler. She had been on Prednisone 10 mg in the past, but has been weaned off Prednisone all together. PFT's in June 2015 indicated an FEV1 of 2.50 L, 85% predicted with an FEV1/FVC ratio of 81, TLC of 82% and a DLCO of 89% predicted. These are stable from prior PFT's from 2013. PFT's were updated 9/15/2016 and indicated an FEV1 of 2.39 L, 83% predicted, TLC 79% and DLCO 97%. PFT's 11/22/2017 indicated an FEV1 of 2.69 L, 94% predicted with an FEV1/FVC ratio of 81, TLC of 87% and DLCO of 92% predicted.     PFT's were updated today in the office and indicates an FEV1 of 2.20 L, 78% predicted with an FEV1/FVC ratio of 80, TLC of 95% with a DLCO of 112% predicted.          She was last seen in the office 11/8/2018 with Kate HOOD with complaints of increased cough with thick mucous production. She was placed on a course of Doxycycline along with a Prednisone taper.      Prior Cardiology consult indicated a history of Pulmonary fibrosis and Pulmonary hypertension. Her pulmonary pressures have improved since she has been on supplemental oxygen. She is on Lasix which is managed by Dr. Pierce, Cardiology. She has a history of respiratory failure and MRSA pneumonia with ARDS in 2009. Chest xray 9/2015 indicated stable scarring. Echo in 10/2015 indicates an EF of 70% with grade I diastolic dysfunction and an estimated RVSP of 40 mmHg. CTA 10/2015 indicated no evidence of PE. There is underlying areas of non specific fibrosis and bronchiectasis, similar to CT in 2011. She does have a flutter valve for mucous clearance. Chest xray April 2016 indicated bilateral areas of linear increased density with architectural distortion to be due to scarring or interstitial lung disease.    She had an exacerbation in March 2016 requiring antibiotics and steroids. Sputum cultures and Immunoglobulin levels were ordered. IgM, IgG and IgA levels are WNL. Sputum indicated heavy growth normal upper respiratory david. AFB is negative and Fungal notes rare candida, likely contaminate.      She is feeling better overall. She does have an intermittent cough. She does produce produce occasional yellow mucous. She is able to clear it with coughing. She denies current wheezing. She denies any fevers or chills. She notes dyspnea with exertion. She feels this is stable. She has had a little weight gain and admits to not being as active as she had been in the past. She denies significant sinus drainage. However she feels her cough may be allergy trigger. She feels her cough is often worse in the Spring and Fall months. Singulair was added to her regimen. She feels ranitidine works well to control her reflux.  She denies any fevers or chills.   She is pending a left hemicolectomy in January 2019.     Chest xray 12/2/2018 indicates;  Stable diffuse parenchymal scarring. No acute abnormality is noted.    Past Medical History:   Diagnosis Date   • ARDS (adult respiratory distress syndrome) (HCC)    • ARDS (adult respiratory distress syndrome) (HCC)    • Arrhythmia    • Arthritis     spine, knees   • ASTHMA    • Backpain     L2 Compression Fx (chronic -Morphine 60mg x6 tabs qhs)    • Clostridium difficile diarrhea 2012   • Depression    • Gall stones    • GERD (gastroesophageal reflux disease)    • Hx MRSA infection    • MRSA (methicillin resistant Staphylococcus aureus)    • MRSA infection    • O2 dependent    • Psychiatric problem    • Pulmonary fibrosis (HCC)     Pierce   • Pulmonary HTN ([facility])    • Sleep apnea        Past Surgical History:   Procedure Laterality Date   • RECTUS REPAIR  12/28/2011    Performed by AUGUST OREILLY at SURGERY SAME DAY [facility] ORS   • ORBITAL FRACTURE ORIF  8/23/2011    Performed by JANIE GEORGES at SURGERY SAME DAY [facility] ORS   • ORBITAL FRACTURE ORIF  7/26/2011    Performed by JANIE GEORGES at SURGERY [facility] ORS   • GASTROSTOMY LAPAROSCOPIC  9/12/2009    Performed by IGNACIO DEGROOT at SURGERY [facility] ORS   • TRACHEOSTOMY  9/3/2009    Performed by RIC ISNGLETARY at SURGERY [facility] ORS   • OTHER  8/09    chest tube, trach, g-tube   • OTHER  1986    skin graft to leg   • OTHER ORTHOPEDIC SURGERY      2 hip repl orbital plates       History reviewed. No pertinent family history.    Social History     Social History   • Marital status:      Spouse name: N/A   • Number of children: N/A   • Years of education: N/A     Occupational History   • Not on file.     Social History Main Topics   • Smoking status: Never Smoker   • Smokeless tobacco: Never Used   • Alcohol use 0.0 - 1.2 oz/week      Comment: rarely   • Drug use: No   • Sexual activity: Not on file      Other Topics Concern   • Not on file     Social History Narrative   • No narrative on file       ROS:  Constitutional: Denies fevers, chills, sweats, weight loss  Eyes: Denies glasses, vision loss, pain, drainage, double vision  Ears/Nose/Mouth/Throat: Denies ear ache, difficulty hearing, sore throat, persistent hoarseness, decayed teeth/toothache  Cardiovascular: Denies chest pain, tightness, palpitations, swelling in feet/legs, fainting, difficulty breathing when laying down  Respiratory: See HPI   GI: Denies difficulty swallowing, nausea, vomiting, abdominal pain, diarrhea, constipation  : Denies frequent urination, painful urination  Integumentary: Denies rashes, lumps or color changes  MSK: Denies painful joints, sore muscles, and back pain.   Neurological: Denies frequent headaches, dizziness, weakness  Sleep: See HPI       Current Outpatient Prescriptions   Medication Sig Dispense Refill   • fluticasone-salmeterol (ADVAIR DISKUS) 500-50 MCG/DOSE AEROSOL POWDER, BREATH ACTIVATED INHALE 1 PUFF BY MOUTH 2 TIMES A DAY. rinse mouth after each use 1 Inhaler 5   • albuterol (PROVENTIL) 2.5mg/3ml Nebu Soln solution for nebulization 3 mL by Nebulization route 4 times a day. 30 Bullet 12   • albuterol 108 (90 Base) MCG/ACT Aero Soln inhalation aerosol Inhale 2 Puffs by mouth every 6 hours as needed. 8.5 g 6   • montelukast (SINGULAIR) 10 MG Tab Take 1 Tab by mouth every evening. 30 Tab 11   • Dextromethorphan-Quinidine (NUEDEXTA PO) Take  by mouth.     • NARATRIPTAN HCL PO Take  by mouth.     • SPIRIVA HANDIHALER 18 MCG Cap INHALE THE CONTENTS OF ONE CAPSULE ONE TIME DAILY VIA HANDIHALER 30 Cap 11   • furosemide (LASIX) 20 MG Tab Take 1-2 Tabs by mouth 1 time daily as needed (swelling). 120 Tab 0   • nystatin (MYCOSTATIN) 568902 UNIT/GM Cream topical cream Apply topically twice daily to affected areas 1 Tube 1   • mupirocin (BACTROBAN) 2 % Ointment Apply to affected areas every day 1 Tube 2   • potassium Chloride  "ER (K-TAB) 20 MEQ Tab CR tablet Take 20 mEq by mouth 2 times a day.     • ranitidine (ZANTAC) 150 MG TABS Take 150 mg by mouth 2 times a day.     • cholestyramine (QUESTRAN) 4 G packet Take 1 Packet by mouth every day.     • therapeutic multivitamin-minerals (THERAGRAN-M) TABS Take 1 Tab by mouth every day.     • predniSONE (DELTASONE) 10 MG Tab Take 40mg x 4 days, then take 30mg x 4 days, then take 20mg x 4 days, then 10mg x 4 days with food, then discontinue. (Patient not taking: Reported on 12/26/2018) 40 Tab 1   • nitroglycerin (NITROSTAT) 0.4 MG SL Tab Place 0.4 mg under tongue every 5 minutes as needed for Chest Pain.     • Non Formulary Request Uses 3 liters 02 at rest 5-6 liters with exertion       No current facility-administered medications for this visit.        Allergies   Allergen Reactions   • Tape Rash     rash   • Clindamycin      C-Diff (Intestinal Infection)       Blood pressure 146/80, pulse 96, temperature 36.2 °C (97.2 °F), temperature source Temporal, resp. rate 16, height 1.702 m (5' 7\"), weight 106.6 kg (235 lb), last menstrual period 05/28/2008, SpO2 94 %.    PE:   Appearance: Well developed, well nourished, no acute distress  Eyes: PERRL, EOM intact, sclera white, conjunctiva moist  Ears: no lesions or deformities  Hearing: grossly intact  Nose: no lesions or deformities  Oropharynx: tongue normal, posterior pharynx without erythema or exudate  Mallampati Classification: Class 4   Neck: supple, trachea midline, no masses   Respiratory effort: no intercostal retractions or use of accessory muscles  Lung auscultation: no rales, rhonchi or wheezes  Heart auscultation: no murmur rub or gallop  Extremities: no cyanosis or edema  Abdomen: soft ,non tender, no masses  Gait and Station: normal  Digits and nails: no clubbing, cyanosis, petechiae or nodes.  Cranial nerves: grossly intact  Skin: no rashes, lesions or ulcers noted  Orientation: Oriented to time, person and place  Mood and affect: mood " and affect appropriate, normal interaction with examiner  Judgement: Intact          Assessment:  1. Moderate persistent asthma without complication  doxycycline (VIBRAMYCIN) 100 MG Cap    predniSONE (DELTASONE) 10 MG Tab    guaifenesin-codeine (CHERATUSSIN AC) Solution oral solution   2. Interstitial lung disease (HCC)     3. ELIA (obstructive sleep apnea)     4. BMI 36.0-36.9,adult     5. Cough  guaifenesin-codeine (CHERATUSSIN AC) Solution oral solution         Plan:    1) PFT's reviewed. Overall stable. She is feeling better. She does still have a cough which has been chronic for her. Discussed importance of mucous clearing. She will continue Advair, Spiriva and Albuterol per nebulizer and HFA inhaler. Continue Mucinex OTC. She was provided a refill for Cheratussin AC to have on hand. She does have a Flutter valve on hand to assist in mucous clearance.   2) Discussed importance of prompt treatment of acute infectious symptoms. RX for Doxycycline and Prednisone taper provided to have on hand. She does have a prescription for Levaquin on hand as well.   3) Continue to follow with Cardiology for diuretic management. She states she is pending an updated Echo and follow up with Dr. Pierce at [facility]. Will request copy of testing and consult note.   4) Continue 02 3 LPM 24/7. 5 LPM with exertion.  5) Ongoing weight loss efforts encouraged. Encouraged routine walking/exercise.   6) Patient is up to date on her Prevnar 13, Pneumovax 23 and Influenza vaccinations.  7) Continue OTC antihistamine prn. Continue Singulair 10 mg qhs.   8) Continue CPAP at 12 CM H20 with 3 LPM 02 bleed in. Sleep hygiene discussed.   9) Follow up in 6 months, sooner if needed.     She is requesting clearance for an upcoming hemicolectomy with Dr. Becerra. PFT's are stable. Chest xray from 12/2/2018 is stable as well. She is cleared from a Pulmonary standpoint for needed upcoming surgery. She is encouraged early mobilization, cough and deep  breathing, routine use of nebulizer and use of Incentive Spirometer post op. She is also encouraged to bring her CPAP machine with her to the hospital.       Electronically signed by DANN Paul.[email or web address]  on 12/26/18    Maxime Becerra MD  [address] [place] [address] [place] [address]  VIA Mail

## 2018-12-26 NOTE — PROGRESS NOTES
Chief Complaint   Patient presents with   • Results     PFT   • Asthma   • Apnea   • Hypoxemia       HPI:  Tayler Osuna is a 63 y.o. year old female here today for follow-up on her Asthma and Obstructive sleep apnea. She is here to review results of updated PFT's.     She is compliant on CPAP 12 CM with 3 LPM 02 bleed in. CNOX in the past on this setting indicated adequate saturations with a mean of 95.3%. Compliance card download today in the office indicates an AHI of 0.7 with an average use of 8 hours at night. She tolerates the pressure well. She has nasal pillows which she feels are comfortable. She does feel she sleeps better on therapy and wakes more refreshed. She denies any morning headaches.      She is on 02 3 LPM during the day, but often requires 5-6 LPM with exertion. She is compliant on Advair, Spiriva, Albuterol per nebulizer and Proair HFA inhaler. She had been on Prednisone 10 mg in the past, but has been weaned off Prednisone all together. PFT's in June 2015 indicated an FEV1 of 2.50 L, 85% predicted with an FEV1/FVC ratio of 81, TLC of 82% and a DLCO of 89% predicted. These are stable from prior PFT's from 2013. PFT's were updated 9/15/2016 and indicated an FEV1 of 2.39 L, 83% predicted, TLC 79% and DLCO 97%. PFT's 11/22/2017 indicated an FEV1 of 2.69 L, 94% predicted with an FEV1/FVC ratio of 81, TLC of 87% and DLCO of 92% predicted.     PFT's were updated today in the office and indicates an FEV1 of 2.20 L, 78% predicted with an FEV1/FVC ratio of 80, TLC of 95% with a DLCO of 112% predicted.     She was last seen in the office 11/8/2018 with Kate HOOD with complaints of increased cough with thick mucous production. She was placed on a course of Doxycycline along with a Prednisone taper.      Prior Cardiology consult indicated a history of Pulmonary fibrosis and Pulmonary hypertension. Her pulmonary pressures have improved since she has been on supplemental oxygen. She is on Lasix which  is managed by Dr. Pierce, Cardiology. She has a history of respiratory failure and MRSA pneumonia with ARDS in 2009. Chest xray 9/2015 indicated stable scarring. Echo in 10/2015 indicates an EF of 70% with grade I diastolic dysfunction and an estimated RVSP of 40 mmHg. CTA 10/2015 indicated no evidence of PE. There is underlying areas of non specific fibrosis and bronchiectasis, similar to CT in 2011. She does have a flutter valve for mucous clearance. Chest xray April 2016 indicated bilateral areas of linear increased density with architectural distortion to be due to scarring or interstitial lung disease.    She had an exacerbation in March 2016 requiring antibiotics and steroids. Sputum cultures and Immunoglobulin levels were ordered. IgM, IgG and IgA levels are WNL. Sputum indicated heavy growth normal upper respiratory david. AFB is negative and Fungal notes rare candida, likely contaminate.      She is feeling better overall. She does have an intermittent cough. She does produce produce occasional yellow mucous. She is able to clear it with coughing. She denies current wheezing. She denies any fevers or chills. She notes dyspnea with exertion. She feels this is stable. She has had a little weight gain and admits to not being as active as she had been in the past. She denies significant sinus drainage. However she feels her cough may be allergy trigger. She feels her cough is often worse in the Spring and Fall months. Singulair was added to her regimen. She feels ranitidine works well to control her reflux. She denies any fevers or chills.   She is pending a left hemicolectomy in January 2019.     Chest xray 12/2/2018 indicates;  Stable diffuse parenchymal scarring. No acute abnormality is noted.    Past Medical History:   Diagnosis Date   • ARDS (adult respiratory distress syndrome) ([facility])    • ARDS (adult respiratory distress syndrome) (HCC)    • Arrhythmia    • Arthritis     spine, knees   • ASTHMA    •  Backpain     L2 Compression Fx (chronic -Morphine 60mg x6 tabs qhs)    • Clostridium difficile diarrhea 2012   • Depression    • Gall stones    • GERD (gastroesophageal reflux disease)    • Hx MRSA infection    • MRSA (methicillin resistant Staphylococcus aureus)    • MRSA infection    • O2 dependent    • Psychiatric problem    • Pulmonary fibrosis (HCC)     Pierce   • Pulmonary HTN (HCC)    • Sleep apnea        Past Surgical History:   Procedure Laterality Date   • RECTUS REPAIR  12/28/2011    Performed by AUGUST OREILLY at SURGERY SAME DAY [facility] ORS   • ORBITAL FRACTURE ORIF  8/23/2011    Performed by JANIE GEORGES at SURGERY SAME DAY [facility] ORS   • ORBITAL FRACTURE ORIF  7/26/2011    Performed by JANIE GEORGES at SURGERY [facility] ORS   • GASTROSTOMY LAPAROSCOPIC  9/12/2009    Performed by IGNACIO DEGROOT at SURGERY [facility] ORS   • TRACHEOSTOMY  9/3/2009    Performed by RIC SINGLETARY at SURGERY [facility] ORS   • OTHER  8/09    chest tube, trach, g-tube   • OTHER  1986    skin graft to leg   • OTHER ORTHOPEDIC SURGERY      2 hip repl orbital plates       History reviewed. No pertinent family history.    Social History     Social History   • Marital status:      Spouse name: N/A   • Number of children: N/A   • Years of education: N/A     Occupational History   • Not on file.     Social History Main Topics   • Smoking status: Never Smoker   • Smokeless tobacco: Never Used   • Alcohol use 0.0 - 1.2 oz/week      Comment: rarely   • Drug use: No   • Sexual activity: Not on file     Other Topics Concern   • Not on file     Social History Narrative   • No narrative on file       ROS:  Constitutional: Denies fevers, chills, sweats, weight loss  Eyes: Denies glasses, vision loss, pain, drainage, double vision  Ears/Nose/Mouth/Throat: Denies ear ache, difficulty hearing, sore throat, persistent hoarseness, decayed teeth/toothache  Cardiovascular: Denies chest pain, tightness,  palpitations, swelling in feet/legs, fainting, difficulty breathing when laying down  Respiratory: See HPI   GI: Denies difficulty swallowing, nausea, vomiting, abdominal pain, diarrhea, constipation  : Denies frequent urination, painful urination  Integumentary: Denies rashes, lumps or color changes  MSK: Denies painful joints, sore muscles, and back pain.   Neurological: Denies frequent headaches, dizziness, weakness  Sleep: See HPI       Current Outpatient Prescriptions   Medication Sig Dispense Refill   • fluticasone-salmeterol (ADVAIR DISKUS) 500-50 MCG/DOSE AEROSOL POWDER, BREATH ACTIVATED INHALE 1 PUFF BY MOUTH 2 TIMES A DAY. rinse mouth after each use 1 Inhaler 5   • albuterol (PROVENTIL) 2.5mg/3ml Nebu Soln solution for nebulization 3 mL by Nebulization route 4 times a day. 30 Bullet 12   • albuterol 108 (90 Base) MCG/ACT Aero Soln inhalation aerosol Inhale 2 Puffs by mouth every 6 hours as needed. 8.5 g 6   • montelukast (SINGULAIR) 10 MG Tab Take 1 Tab by mouth every evening. 30 Tab 11   • Dextromethorphan-Quinidine (NUEDEXTA PO) Take  by mouth.     • NARATRIPTAN HCL PO Take  by mouth.     • SPIRIVA HANDIHALER 18 MCG Cap INHALE THE CONTENTS OF ONE CAPSULE ONE TIME DAILY VIA HANDIHALER 30 Cap 11   • furosemide (LASIX) 20 MG Tab Take 1-2 Tabs by mouth 1 time daily as needed (swelling). 120 Tab 0   • nystatin (MYCOSTATIN) 251179 UNIT/GM Cream topical cream Apply topically twice daily to affected areas 1 Tube 1   • mupirocin (BACTROBAN) 2 % Ointment Apply to affected areas every day 1 Tube 2   • potassium Chloride ER (K-TAB) 20 MEQ Tab CR tablet Take 20 mEq by mouth 2 times a day.     • ranitidine (ZANTAC) 150 MG TABS Take 150 mg by mouth 2 times a day.     • cholestyramine (QUESTRAN) 4 G packet Take 1 Packet by mouth every day.     • therapeutic multivitamin-minerals (THERAGRAN-M) TABS Take 1 Tab by mouth every day.     • predniSONE (DELTASONE) 10 MG Tab Take 40mg x 4 days, then take 30mg x 4 days, then  "take 20mg x 4 days, then 10mg x 4 days with food, then discontinue. (Patient not taking: Reported on 12/26/2018) 40 Tab 1   • nitroglycerin (NITROSTAT) 0.4 MG SL Tab Place 0.4 mg under tongue every 5 minutes as needed for Chest Pain.     • Non Formulary Request Uses 3 liters 02 at rest 5-6 liters with exertion       No current facility-administered medications for this visit.        Allergies   Allergen Reactions   • Tape Rash     rash   • Clindamycin      C-Diff (Intestinal Infection)       Blood pressure 146/80, pulse 96, temperature 36.2 °C (97.2 °F), temperature source Temporal, resp. rate 16, height 1.702 m (5' 7\"), weight 106.6 kg (235 lb), last menstrual period 05/28/2008, SpO2 94 %.    PE:   Appearance: Well developed, well nourished, no acute distress  Eyes: PERRL, EOM intact, sclera white, conjunctiva moist  Ears: no lesions or deformities  Hearing: grossly intact  Nose: no lesions or deformities  Oropharynx: tongue normal, posterior pharynx without erythema or exudate  Mallampati Classification: Class 4   Neck: supple, trachea midline, no masses   Respiratory effort: no intercostal retractions or use of accessory muscles  Lung auscultation: no rales, rhonchi or wheezes  Heart auscultation: no murmur rub or gallop  Extremities: no cyanosis or edema  Abdomen: soft ,non tender, no masses  Gait and Station: normal  Digits and nails: no clubbing, cyanosis, petechiae or nodes.  Cranial nerves: grossly intact  Skin: no rashes, lesions or ulcers noted  Orientation: Oriented to time, person and place  Mood and affect: mood and affect appropriate, normal interaction with examiner  Judgement: Intact          Assessment:  1. Moderate persistent asthma without complication  doxycycline (VIBRAMYCIN) 100 MG Cap    predniSONE (DELTASONE) 10 MG Tab    guaifenesin-codeine (CHERATUSSIN AC) Solution oral solution   2. Interstitial lung disease (HCC)     3. ELIA (obstructive sleep apnea)     4. BMI 36.0-36.9,adult     5. Cough "  guaifenesin-codeine (CHERATUSSIN AC) Solution oral solution         Plan:    1) PFT's reviewed. Overall stable. She is feeling better. She does still have a cough which has been chronic for her. Discussed importance of mucous clearing. She will continue Advair, Spiriva and Albuterol per nebulizer and HFA inhaler. Continue Mucinex OTC. She was provided a refill for Cheratussin AC to have on hand. She does have a Flutter valve on hand to assist in mucous clearance.   2) Discussed importance of prompt treatment of acute infectious symptoms. RX for Doxycycline and Prednisone taper provided to have on hand. She does have a prescription for Levaquin on hand as well.   3) Continue to follow with Cardiology for diuretic management. She states she is pending an updated Echo and follow up with Dr. Pierce at [facility]. Will request copy of testing and consult note.   4) Continue 02 3 LPM 24/7. 5 LPM with exertion.  5) Ongoing weight loss efforts encouraged. Encouraged routine walking/exercise.   6) Patient is up to date on her Prevnar 13, Pneumovax 23 and Influenza vaccinations.  7) Continue OTC antihistamine prn. Continue Singulair 10 mg qhs.   8) Continue CPAP at 12 CM H20 with 3 LPM 02 bleed in. Sleep hygiene discussed.   9) Follow up in 6 months, sooner if needed.     She is requesting clearance for an upcoming hemicolectomy with Dr. Becerra. PFT's are stable. Chest xray from 12/2/2018 is stable as well. She is cleared from a Pulmonary standpoint for needed upcoming surgery. She is encouraged early mobilization, cough and deep breathing, routine use of nebulizer and use of Incentive Spirometer post op. She is also encouraged to bring her CPAP machine with her to the hospital.      Narcotics: 411 (Value from [employer] System.)    Sedatives: 190 (Value from [employer].)    Stimulants: 0 (Value from [employer].)     [employer] can range from 000 to 999. This first two digits represent the composite percentile  risk based on an overall analysis of prescription drug use. The third digit represents the number of active prescriptions. The distribution of scores in the population is such that approximately 75% fall below 200, 95% fall below 500 and 99% fall below 650.     SELECT THE LINK BELOW TO REVIEW THE [employer] REPORT  or  SELECT THE 'ACCEPT' BUTTON TO CONTINUTE AFTER REVIEWING THE SCORES

## 2019-01-11 ENCOUNTER — HOSPITAL ENCOUNTER (OUTPATIENT)
Dept: HOSPITAL 8 - CFH | Age: 64
Discharge: HOME | End: 2019-01-11
Attending: INTERNAL MEDICINE
Payer: MEDICARE

## 2019-01-11 DIAGNOSIS — I07.1: Primary | ICD-10-CM

## 2019-01-11 DIAGNOSIS — I27.20: ICD-10-CM

## 2019-01-11 DIAGNOSIS — R06.02: ICD-10-CM

## 2019-01-11 PROCEDURE — 93306 TTE W/DOPPLER COMPLETE: CPT

## 2021-12-29 NOTE — PROGRESS NOTES
Case: [id number] Date/Time: 12/28/21 2145    Procedure: OPEN REDUCTION AND INTERNAL FIXATION, HIP (Right )    Location: [place] [address] / SURGERY [facility]    Surgeons: Tung Jeff M.D.          Relevant Problems   Other   (positive) Closed displaced fracture of right femoral neck (HCC)       Physical Exam    Airway   Mallampati: II  TM distance: >3 FB  Neck ROM: full       Cardiovascular - normal exam  Rhythm: regular  Rate: normal  (-) murmur     Dental - normal exam           Pulmonary - normal exam  Breath sounds clear to auscultation     Abdominal    Neurological - normal exam                 Anesthesia Plan    ASA 2- EMERGENT   ASA physical status emergent criteria: displaced fracture with possible neurovascular compromise    Plan - general       Airway plan will be LMA          Induction: intravenous      Pertinent diagnostic labs and testing reviewed    Informed Consent:    Anesthetic plan and risks discussed with patient.         Chief Complaint   Patient presents with   • Follow-Up     6 month follow up       HPI:  Tayler Osuna is a 61 y.o. year old female here today for follow-up on her Asthma and Obstructive sleep apnea. She is compliant on CPAP 12 CM with 3 LPM 02 bleed in. CNOX in the past on this setting indicated adequate saturations with a mean of 95.3%. She did not bring her compliance chip to today's office visit. She is on 02 3 LPM during the day, but often requires 5-6 LPM with exertion. She is compliant on Advair, Spiriva, Albuterol per nebulizer and Proair HFA inhaler. She had been on Prednisone 10 mg in the past, but has been weaned off Prednisone all together. PFT's in June 2015 indicated an FEV1 of 2.50 L, 85% predicted with an FEV1/FVC ratio of 81, TLC of 82% and a DLCO of 89% predicted. These are stable from prior PFT's from 2013. PFT's were updated 9/15/2016 and indicated an FEV1 of 2.39 L, 83% predicted, TLC 79% and DLCO 97%. Prior Cardiology consult indicated a history of Pulmonary fibrosis and Pulmonary hypertension. Her pulmonary pressures have improved since she has been on supplemental oxygen. She is on Lasix which is managed by Dr. Pierce, Cardiology. She has a history of respiratory failure and MRSA pneumonia with ARDS in 2009. Chest xray 9/2015 indicated stable scarring. Echo in 10/2015 indicates an EF of 70% with grade I diastolic dysfunction and an estimated RVSP of 40 mmHg. CTA 10/2015 indicated no evidence of PE. There is underlying areas of non specific fibrosis and bronchiectasis, similar to CT in 2011. She does have a flutter valve for mucous clearance. Chest xray April 2016 indicated bilateral areas of linear increased density with architectural distortion to be due to scarring or interstitial lung disease.    She had an exacerbation in March 2016 requiring antibiotics and steroids. Sputum cultures and Immunoglobulin levels were ordered. IgM, IgG and IgA levels are WNL. Sputum indicated heavy growth  normal upper respiratory david. AFB is negative and Fungal notes rare candida, likely contaminate.        She started a new diet in January 2017 and is down 25 pounds. She is feeling better. She feels her dyspnea has improved with the weight loss.  Her cough has improved. She notes an intermittent cough with occasional clear to yellow mucous which she feels is back to her baseline. She is able to clear her mucous easier. She notes a rare wheeze in the evenings. He denies any fevers or chills. She denies any recent respiratory infections. She is using her CPAP nightly and tolerates is well. She does feel she sleeps better on therapy and wakes more refreshed.        Past Medical History   Diagnosis Date   • GERD (gastroesophageal reflux disease)    • ASTHMA    • Psychiatric problem    • Depression    • ARDS (adult respiratory distress syndrome) (CMS-[facility])    • Hx MRSA infection    • Arrhythmia    • Sleep apnea    • Arthritis      spine, knees   • MRSA (methicillin resistant Staphylococcus aureus)    • Backpain      L2 Compression Fx (chronic -Morphine 60mg x6 tabs qhs)    • O2 dependent    • Clostridium difficile diarrhea 2012   • Pulmonary HTN (CMS-[facility])    • Pulmonary fibrosis (CMS-[facility])      Stan   • MRSA infection    • Gall stones    • ARDS (adult respiratory distress syndrome) (CMS-HCC)        Past Surgical History   Procedure Laterality Date   • Tracheostomy  9/3/2009     Performed by RIC SINGLETARY at SURGERY [facility] ORS   • Gastrostomy laparoscopic  9/12/2009     Performed by IGNACIO DEGROOT at SURGERY [facility] ORS   • Orbital fracture orif  7/26/2011     Performed by JANIE GEORGES at SURGERY [facility]   • Orbital fracture orif  8/23/2011     Performed by JANIE GEORGES at SURGERY SAME DAY [facility] ORS   • Other  8/09     chest tube, trach, g-tube   • Other  1986     skin graft to leg   • Rectus repair  12/28/2011     Performed by AUGUST OREILLY at SURGERY SAME DAY [facility] ORS   •  Other orthopedic surgery       2 hip repl orbital plates       History reviewed. No pertinent family history.    Social History     Social History   • Marital Status:      Spouse Name: N/A   • Number of Children: N/A   • Years of Education: N/A     Occupational History   • Not on file.     Social History Main Topics   • Smoking status: Never Smoker    • Smokeless tobacco: Not on file   • Alcohol Use: 0.0 oz/week     0 Standard drinks or equivalent per week      Comment: rarely   • Drug Use: No   • Sexual Activity: Not on file     Other Topics Concern   • Not on file     Social History Narrative         ROS:  Constitutional: Denies fevers, chills, sweats, fatigue, weight loss  Eyes: Denies glasses, vision loss, pain, drainage, double vision  Ears/Nose/Mouth/Throat: Denies rhinitis, nasal congestion, ear ache, difficulty hearing, sore throat, persistent hoarseness, decayed teeth/toothache  Cardiovascular: Denies chest pain, tightness, palpitations, swelling in feet/legs, fainting, difficulty breathing when laying down  Respiratory: Denies shortness of breath, cough, sputum, wheezing, painful breathing, coughing up blood  GI: Denies heartburn, difficulty swallowing, nausea, vomiting, abdominal pain, diarrhea, constipation  : Denies frequent urination, painful urination  Integumentary: Denies rashes, lumps or color changes  MSK: Denies painful joints, sore muscles, and back pain.   Neurological: Denies frequent headaches, dizziness, weakness  Sleep: See HPI       Current Outpatient Prescriptions on File Prior to Visit   Medication Sig Dispense Refill   • ADVAIR DISKUS 500-50 MCG/DOSE AEROSOL POWDER, BREATH ACTIVATED INHALE 1 PUFF BY MOUTH 2 TIMES A DAY, rinse mouth ater each use 3 Inhaler 3   • potassium Chloride ER (K-TAB) 20 MEQ Tab CR tablet Take 20 mEq by mouth 2 times a day.     • SPIRIVA HANDIHALER 18 MCG Cap inhale contents of 1 capsule once daily, using handihaler 30 Cap 5   • furosemide (LASIX) 20 MG  Tab Take 1 Tab by mouth every day.     • ranitidine (ZANTAC) 150 MG TABS Take 150 mg by mouth 2 times a day.     • cholestyramine (QUESTRAN) 4 G packet Take 1 Packet by mouth every day.     • therapeutic multivitamin-minerals (THERAGRAN-M) TABS Take 1 Tab by mouth every day.     • albuterol (PROVENTIL) 2.5mg/3ml NEBU Inhale 2.5 mg mist 4 times a day.     • promethazine (PHENERGAN) 25 MG Tab Take one by mouth everyday 30 Tab 1   • guaifenesin-codeine (CHERATUSSIN AC) Solution oral solution Take 5 mL by mouth every 6 hours as needed for Cough. 240 mL 0   • guaifenesin-codeine (ROBITUSSIN AC) Solution oral solution Take 5 mL by mouth every 6 hours as needed for Cough. 240 mL 0   • predniSONE (DELTASONE) 10 MG Tab 1 po daily and as directed During periods of exacerbation use 3 po x days, 2 po x days then back to maintenance dose of 10 mg daily 50 Tab 2   • levofloxacin (LEVAQUIN) 500 MG tablet Take 1 Tab by mouth every day. 10 Tab 1   • levofloxacin (LEVAQUIN) 500 MG tablet Take 1 Tab by mouth every day. 10 Tab 0   • oxycodone-acetaminophen (PERCOCET) 5-325 MG Tab Take 1-2 Tabs by mouth every four hours as needed (Pain). 20 Tab 0   • guaifenesin-codeine (ROBITUSSIN AC) Solution Take 5 mL by mouth every 6 hours as needed for Cough. (Patient not taking: Reported on 4/6/2016) 240 mL 0   • Non Formulary Request Uses 3 liters 02 at rest 5-6 liters with exertion     • aspirin (ASA) 325 MG TABS Take 325 mg by mouth Once.     • albuterol (VENTOLIN OR PROVENTIL) 108 (90 BASE) MCG/ACT AERS Inhale 2 Puffs by mouth every 6 hours as needed.     • cyclobenzaprine (FLEXERIL) 10 MG TABS Take  by mouth.     • NON SPECIFIED O2 3-4L/nc at rest; 5-6L/nc with exertion     • ondansetron (ZOFRAN) 4 MG TABS Take 4 mg by mouth every four hours as needed for Nausea/Vomiting. As needed for nausea or vomiting.        No current facility-administered medications on file prior to visit.     Tape and Clindamycin    Blood pressure 122/74, pulse 77,  "temperature 36.3 °C (97.3 °F), resp. rate 16, height 1.702 m (5' 7.01\"), weight 98.612 kg (217 lb 6.4 oz), last menstrual period 05/28/2008, SpO2 97 %.  PE:   Appearance: Well developed, well nourished, no acute distress  Eyes: PERRL, EOM intact, sclera white, conjunctiva moist  Ears: no lesions or deformities  Hearing: grossly intact  Nose: no lesions or deformities  Oropharynx: tongue normal, posterior pharynx without erythema or exudate  Mallampati Classification: class 3   Neck: supple, trachea midline, no masses   Respiratory effort: no intercostal retractions or use of accessory muscles  Lung auscultation: no rales, rhonchi or wheezes  Heart auscultation: no murmur rub or gallop  Extremities: no cyanosis or edema  Abdomen: soft ,non tender, no masses  Gait and Station: normal  Digits and nails: no clubbing, cyanosis, petechiae or nodes.  Cranial nerves: grossly intact  Skin: no rashes, lesions or ulcers noted  Orientation: Oriented to time, person and place  Mood and affect: mood and affect appropriate, normal interaction with examiner  Judgement: Intact          Assessment:  1. Mild persistent asthma without complication     2. Interstitial lung disease (CMS-HCC)     3. Hypoxemia     4. Abnormal CT of the chest     5. BMI 34.0-34.9,adult           Plan:    1) Continue Advair 500/50, Spiriva, Albuterol per neb and HFA inhaler.    2) RX for Levaquin 500 mg 1 po daily x 10 days and Prednisone taper to have on hand.    3) Continue to follow with Cardiology for diuretic management. Request consult records from Dr. Pierce, Cardiology.   4) RX for Cheratussin A/C 5 ml po q 6 hours prn cough to have on hand.    5) Continue 02 3 LPM 24/7, 5 LPM with exertion.  6) Ongoing weight loss efforts encourage. Encouraged routine walking/exercise.  7) She does have a flutter valve on hand to help with mucous clearance.  8) Continue CPAP at 12 CM H20 with 3 LPM 02 bleed in. Order to DME for new mask and supplies. Compliance " chip at follow up.  9) She is up to date on her Prevnar 13, Pneumovax 23 and Influence vaccines.   10) Follow up in 6 months, sooner if needed.